# Patient Record
Sex: FEMALE | Race: OTHER | NOT HISPANIC OR LATINO
[De-identification: names, ages, dates, MRNs, and addresses within clinical notes are randomized per-mention and may not be internally consistent; named-entity substitution may affect disease eponyms.]

---

## 2017-12-05 ENCOUNTER — APPOINTMENT (OUTPATIENT)
Dept: ORTHOPEDIC SURGERY | Facility: CLINIC | Age: 57
End: 2017-12-05
Payer: COMMERCIAL

## 2017-12-05 PROCEDURE — 99213 OFFICE O/P EST LOW 20 MIN: CPT

## 2018-01-22 ENCOUNTER — APPOINTMENT (OUTPATIENT)
Dept: ORTHOPEDIC SURGERY | Facility: CLINIC | Age: 58
End: 2018-01-22
Payer: COMMERCIAL

## 2018-01-22 PROCEDURE — 73562 X-RAY EXAM OF KNEE 3: CPT | Mod: RT

## 2018-01-22 PROCEDURE — 99213 OFFICE O/P EST LOW 20 MIN: CPT | Mod: 25

## 2018-01-22 PROCEDURE — 73030 X-RAY EXAM OF SHOULDER: CPT | Mod: RT

## 2018-01-22 PROCEDURE — 20610 DRAIN/INJ JOINT/BURSA W/O US: CPT | Mod: RT

## 2018-02-12 ENCOUNTER — APPOINTMENT (OUTPATIENT)
Dept: ORTHOPEDIC SURGERY | Facility: CLINIC | Age: 58
End: 2018-02-12
Payer: COMMERCIAL

## 2018-02-12 VITALS — HEIGHT: 59 IN | WEIGHT: 135 LBS | BODY MASS INDEX: 27.21 KG/M2

## 2018-02-12 DIAGNOSIS — S46.811D STRAIN OF OTHER MUSCLES, FASCIA AND TENDONS AT SHOULDER AND UPPER ARM LEVEL, RIGHT ARM, SUBSEQUENT ENCOUNTER: ICD-10-CM

## 2018-02-12 PROCEDURE — 99213 OFFICE O/P EST LOW 20 MIN: CPT

## 2018-02-12 RX ORDER — MELOXICAM 7.5 MG/1
7.5 TABLET ORAL DAILY
Qty: 30 | Refills: 1 | Status: ACTIVE | COMMUNITY
Start: 2018-02-12 | End: 1900-01-01

## 2018-12-11 ENCOUNTER — APPOINTMENT (OUTPATIENT)
Dept: ORTHOPEDIC SURGERY | Facility: CLINIC | Age: 58
End: 2018-12-11
Payer: COMMERCIAL

## 2018-12-11 PROCEDURE — 99213 OFFICE O/P EST LOW 20 MIN: CPT

## 2018-12-11 PROCEDURE — 73590 X-RAY EXAM OF LOWER LEG: CPT | Mod: LT

## 2019-12-02 ENCOUNTER — APPOINTMENT (OUTPATIENT)
Dept: ORTHOPEDIC SURGERY | Facility: CLINIC | Age: 59
End: 2019-12-02
Payer: COMMERCIAL

## 2019-12-02 VITALS
SYSTOLIC BLOOD PRESSURE: 149 MMHG | WEIGHT: 135 LBS | DIASTOLIC BLOOD PRESSURE: 74 MMHG | HEIGHT: 59 IN | BODY MASS INDEX: 27.21 KG/M2 | HEART RATE: 76 BPM

## 2019-12-02 DIAGNOSIS — M75.101 UNSPECIFIED ROTATOR CUFF TEAR OR RUPTURE OF RIGHT SHOULDER, NOT SPECIFIED AS TRAUMATIC: ICD-10-CM

## 2019-12-02 DIAGNOSIS — M67.911 UNSPECIFIED DISORDER OF SYNOVIUM AND TENDON, RIGHT SHOULDER: ICD-10-CM

## 2019-12-02 PROCEDURE — 99214 OFFICE O/P EST MOD 30 MIN: CPT

## 2019-12-03 ENCOUNTER — OTHER (OUTPATIENT)
Age: 59
End: 2019-12-03

## 2020-01-14 ENCOUNTER — APPOINTMENT (OUTPATIENT)
Dept: ORTHOPEDIC SURGERY | Facility: CLINIC | Age: 60
End: 2020-01-14
Payer: COMMERCIAL

## 2020-01-14 PROCEDURE — 73590 X-RAY EXAM OF LOWER LEG: CPT | Mod: LT

## 2020-01-14 PROCEDURE — 99213 OFFICE O/P EST LOW 20 MIN: CPT

## 2020-01-14 NOTE — HISTORY OF PRESENT ILLNESS
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

## 2020-01-14 NOTE — PHYSICAL EXAM
[FreeTextEntry1] : Physical exam reveals a healthy-looking patient in no apparent distress patient appeared to be fully alert oriented having no significant complaints x-rays of the left ankle demonstrates good range of motion no swelling no palpable mass no redness no pain x-ray demonstrates complete resolution of the underlying process distal tibia and dysphagia patient will be followed and will be seen again in one year for followup

## 2021-01-26 ENCOUNTER — APPOINTMENT (OUTPATIENT)
Dept: ORTHOPEDIC SURGERY | Facility: CLINIC | Age: 61
End: 2021-01-26
Payer: COMMERCIAL

## 2021-01-26 PROCEDURE — 73600 X-RAY EXAM OF ANKLE: CPT | Mod: LT

## 2021-01-26 PROCEDURE — 99213 OFFICE O/P EST LOW 20 MIN: CPT

## 2021-01-26 PROCEDURE — 99072 ADDL SUPL MATRL&STAF TM PHE: CPT

## 2021-01-26 NOTE — HISTORY OF PRESENT ILLNESS
[FreeTextEntry1] : Patient was seen today in follow-up several years ago patient presented with a Kali abscess involving the left distal tibia at that time patient underwent intralesional curettage and IV antibiotic eventually patient had complete recovery and return to full level of activity without recurrence of infectious process at this stage patient having no significant complaints

## 2021-01-26 NOTE — PHYSICAL EXAM
[FreeTextEntry1] : Physical exam revealed a healthy looking patient in no apparent distress patient appears to be fully alert oriented basically having no significant complaints examination of the left leg demonstrate fully healed surgical scar no swelling no palpable mass new plain x-ray of the left ankle demonstrate complete bone remodeling and healing of the underlying process at this stage patient will be followed and will be seen again in 1 year for follow-up

## 2021-09-13 ENCOUNTER — EMERGENCY (EMERGENCY)
Facility: HOSPITAL | Age: 61
LOS: 1 days | Discharge: HOME | End: 2021-09-13
Attending: STUDENT IN AN ORGANIZED HEALTH CARE EDUCATION/TRAINING PROGRAM
Payer: COMMERCIAL

## 2021-09-13 VITALS
HEART RATE: 91 BPM | HEIGHT: 55 IN | WEIGHT: 134.92 LBS | OXYGEN SATURATION: 98 % | TEMPERATURE: 98 F | RESPIRATION RATE: 20 BRPM | SYSTOLIC BLOOD PRESSURE: 129 MMHG | DIASTOLIC BLOOD PRESSURE: 78 MMHG

## 2021-09-13 PROCEDURE — 72190 X-RAY EXAM OF PELVIS: CPT

## 2021-09-13 PROCEDURE — 73502 X-RAY EXAM HIP UNI 2-3 VIEWS: CPT | Mod: 26,RT

## 2021-09-13 PROCEDURE — 99284 EMERGENCY DEPT VISIT MOD MDM: CPT

## 2021-09-13 PROCEDURE — 99284 EMERGENCY DEPT VISIT MOD MDM: CPT | Mod: 25

## 2021-09-13 PROCEDURE — 73502 X-RAY EXAM HIP UNI 2-3 VIEWS: CPT

## 2021-09-13 RX ORDER — METHOCARBAMOL 500 MG/1
2 TABLET, FILM COATED ORAL
Qty: 28 | Refills: 0
Start: 2021-09-13 | End: 2021-09-19

## 2021-09-13 RX ORDER — METHOCARBAMOL 500 MG/1
750 TABLET, FILM COATED ORAL ONCE
Refills: 0 | Status: COMPLETED | OUTPATIENT
Start: 2021-09-13 | End: 2021-09-13

## 2021-09-13 RX ORDER — ACETAMINOPHEN 500 MG
650 TABLET ORAL ONCE
Refills: 0 | Status: COMPLETED | OUTPATIENT
Start: 2021-09-13 | End: 2021-09-13

## 2021-09-13 RX ADMIN — METHOCARBAMOL 750 MILLIGRAM(S): 500 TABLET, FILM COATED ORAL at 19:15

## 2021-09-13 RX ADMIN — Medication 650 MILLIGRAM(S): at 19:15

## 2021-09-13 NOTE — ED STATDOCS - PHYSICAL EXAMINATION
Vital Signs per nursing documentation  Gen: well appearing, no acute distress  HEENT: NCAT, MMM  Cardiac: regular rate rhythm, normal S1S2  Chest: clear to auscultation bilateral, no wheezes or crackles  Abdomen: soft, non tender non distended, no hernia  Extremity: no gross deformity, good perfusion  Skin: no rash  Neuro: nonfocal neuro exam, gait steady

## 2021-09-13 NOTE — ED STATDOCS - PROGRESS NOTE DETAILS
pain only when walking. denies pain at rest. discussed return precautions for worsening symptoms such as RLQ pain, fever, vomiting or any other concerning symptoms. patient and  comfortable with dc plan

## 2021-09-13 NOTE — ED STATDOCS - OBJECTIVE STATEMENT
61y/o F with PMHx of HTN, HLD presents to the ED c/o right groin pain which began 1 day ago. Pt states pain is worse when walking. Pt endorses recent injury where she did almost split yesterday, pulling groin. No trauma or treatment PTA, Denies abdominal pain, n/v. No past surgical history.

## 2021-09-13 NOTE — ED STATDOCS - NSFOLLOWUPINSTRUCTIONS_ED_ALL_ED_FT
Take medication as prescribed. Continue all home medications. Return for any right lower quadrant pain, fever, vomiting or any concerning or worsening symptoms

## 2021-09-13 NOTE — ED STATDOCS - PATIENT PORTAL LINK FT
You can access the FollowMyHealth Patient Portal offered by [facility] by registering at the following website: [email or web address]. By joining [facility]’s FollowMyHealth portal, you will also be able to view your health information using other applications (apps) compatible with our system.

## 2022-02-01 ENCOUNTER — APPOINTMENT (OUTPATIENT)
Dept: ORTHOPEDIC SURGERY | Facility: CLINIC | Age: 62
End: 2022-02-01
Payer: COMMERCIAL

## 2022-02-01 VITALS — BODY MASS INDEX: 27.21 KG/M2 | WEIGHT: 135 LBS | HEIGHT: 59 IN

## 2022-02-01 PROCEDURE — 99213 OFFICE O/P EST LOW 20 MIN: CPT

## 2022-02-01 PROCEDURE — 73590 X-RAY EXAM OF LOWER LEG: CPT | Mod: LT

## 2022-02-01 NOTE — HISTORY OF PRESENT ILLNESS
[FreeTextEntry1] : This is a 61 years old female that during early childhood and had a Kali abscess over the left distal tibia 50 years later which been about 7 years ago patient presented with a flareup of a Kali abscess underwent decompression irrigation antibiotic treatment and gradually the pain and swelling subsided and patient returned to full level of activity and no any recurrence since then.  At this point patient having no pain whatsoever return to full level of activity

## 2022-02-01 NOTE — PHYSICAL EXAM
[FreeTextEntry1] : Physical exam revealed a healthy looking patient in no apparent distress patient appears to be fully alert oriented having no complaints examination of the left the ankle demonstrate full range of motion there is a fully healed surgical scar no swelling no palpable mass no redness new plain x-ray of the left distal tibia ankle demonstrate complete resolution of the underlying condition at this stage patient will be followed and will be seen again in 1 year for follow-up

## 2022-02-09 ENCOUNTER — APPOINTMENT (OUTPATIENT)
Dept: ORTHOPEDIC SURGERY | Facility: CLINIC | Age: 62
End: 2022-02-09
Payer: COMMERCIAL

## 2022-02-09 DIAGNOSIS — M17.0 BILATERAL PRIMARY OSTEOARTHRITIS OF KNEE: ICD-10-CM

## 2022-02-09 PROCEDURE — 20610 DRAIN/INJ JOINT/BURSA W/O US: CPT | Mod: LT

## 2022-02-09 PROCEDURE — 73564 X-RAY EXAM KNEE 4 OR MORE: CPT | Mod: 50

## 2022-02-09 PROCEDURE — 99214 OFFICE O/P EST MOD 30 MIN: CPT | Mod: 25

## 2022-02-09 NOTE — DISCUSSION/SUMMARY
[de-identified] : I discussed the treatment of degenerative arthritis with the patient at length today. I described the spectrum of treatment from nonoperative modalities to total joint arthroplasty. Noninvasive and nonoperative treatment modalities include weight reduction, activity modification with low impact exercise, PRN use of acetaminophen or anti-inflammatory medication if tolerated, glucosamine/chondroitin supplements, and physical therapy. Further treatments can include corticosteroid injection and the use of hyaluronic acid injections. Definitive treatment can certainly include total joint arthroplasty also. The risks and benefits of each treatment options was discussed and all questions were answered.\par \par Injection: Right knee joint.\par Indication: Arthritis\par \par A discussion was had with the patient regarding this procedure and all questions were answered. All risks, benefits and alternatives were discussed. These included but were not limited to bleeding, infection, and allergic reaction. Alcohol was used to clean the skin, and betadine was used to sterilize and prep the area in the supero-lateral aspect of the right knee. Ethyl chloride spray was then used as a topical anesthetic. A 21-gauge needle was used to inject 4cc of 1% lidocaine and 1cc of 40mg/ml methylprednisolone into the knee. A sterile bandage was then applied. The patient tolerated the procedure well and there were no complications. \par \par Injection: Left knee joint.\par Indication: Arthritis\par A discussion was had with the patient regarding this procedure and all questions were answered. All risks, benefits and alternatives were discussed. These included but were not limited to bleeding, infection, and allergic reaction. Alcohol was used to clean the skin, and betadine was used to sterilize and prep the area in the supero-lateral aspect of the left knee. Ethyl chloride spray was then used as a topical anesthetic. A 21-gauge needle was used to inject 4cc of 1% lidocaine and 1cc of 40mg/ml methylprednisolone into the knee. A sterile bandage was then applied. The patient tolerated the procedure well and there were no complications.

## 2022-02-09 NOTE — HISTORY OF PRESENT ILLNESS
[de-identified] : 61-year-old Moroccan-speaking female with a chief complaint of bilateral knee pain right greater than left.  She reports pain for several years she reports having done medicine and therapy in the past.  Her pain is worse with walking.  She denies any numbness or tingling.\par \par The patient's past medical history, past surgical history, medications, allergies, and social history were reviewed by me today with the patient and documented accordingly. In addition, the patient's family history, which is noncontributory to this visit, was also reviewed.\par

## 2022-02-09 NOTE — PHYSICAL EXAM
[de-identified] : General Exam\par \par Well developed, well nourished\par No apparent distress\par Oriented to person, place, and time\par Mood: Normal\par Affect: Normal\par Balance and coordination: Normal\par Gait: Normal\par \par left knee exam\par \par Skin: Clean, dry, intact\par Inspection: No obvious malalignment, no masses, no swelling, no effusion.\par Tenderness: no MJLT. No LJLT. No tenderness over the medial and lateral patella facets. No ttp medial/lateral epicondyle, patella tendon, tibial tubercle, pes anserinus, or proximal fibula.\par ROM: 0 to 130° no pain with deep flexion in both knees\par Stability: Stable to varus, valgus, lachman testing. Negative anterior/posterior drawer.\par Additional tests: Negative McMurrays test, Negative patellar grind test. \par Strength: 5/5 Q/H/TA/GS/EHL, no atrophy\par Neuro: In tact to light touch throughout in dp/sp/tib/kevin/saph nerve districutions, DTR's normal\par Pulses: 2+ DP/PT pulses.\par  [de-identified] : \par The following radiographs were ordered and read by me during this patients visit. I reviewed each radiograph in detail with the patient and discussed the findings as highlighted below. \par \par 4 views of both knees were obtained today.  There is severe osteoarthritis in the right knee and moderate in the left knee with joint space narrowing osteophytes and sclerosis

## 2022-05-26 NOTE — ED ADULT TRIAGE NOTE - NSSEPSISSUSPECTED_ED_A_ED
You have been given emergency department evaluation.  This evaluation is intended to rule out life-threatening conditions.  Is not a complete evaluation.  You could require further testing as determined by your primary care physician or any referred specialist.  Please follow-up with all doctors that you are referred to.  Please be sure to take your prescribed medications and follow any specific instructions in the discharge instructions.  Please follow-up with your primary care physician within 48 hours.  Please have your primary care provider recheck your blood pressure.  Please return to the emergency department if you experience chest pain, shortness of breath, abdominal pain, fever greater than 102, intractable vomiting.  Please return to the emergency department if your symptoms continue or worsen, or if you begin to experience any other concerning symptom.    
No

## 2023-02-07 ENCOUNTER — APPOINTMENT (OUTPATIENT)
Dept: ORTHOPEDIC SURGERY | Facility: CLINIC | Age: 63
End: 2023-02-07
Payer: COMMERCIAL

## 2023-02-07 PROCEDURE — 99213 OFFICE O/P EST LOW 20 MIN: CPT

## 2023-02-07 PROCEDURE — 73600 X-RAY EXAM OF ANKLE: CPT | Mod: LT

## 2023-02-07 NOTE — HISTORY OF PRESENT ILLNESS
[FreeTextEntry1] : [place] is a follow-up visit for 62 years old female had a broadly with abscess involving the left distal tibia.  Several years ago patient underwent exploration decompression irrigation and since then remained stable without recurrence of the underlying infectious process.  Patient is stable and ambulating without any discomfort

## 2023-02-07 NOTE — PHYSICAL EXAM
[FreeTextEntry1] : Physical exam revealed a healthy looking patient in no apparent distress patient appears to be fully alert oriented having no complaint examination of the left ankle demonstrate full range of motion no swelling no palpable mass new x-ray demonstrated stable underlying process distal tibia at this stage patient will be followed and will be seen again in 1 year for follow-up

## 2023-03-28 ENCOUNTER — OFFICE (OUTPATIENT)
Dept: URBAN - METROPOLITAN AREA CLINIC 12 | Facility: CLINIC | Age: 63
Setting detail: OPHTHALMOLOGY
End: 2023-03-28
Payer: COMMERCIAL

## 2023-03-28 VITALS — HEIGHT: 55 IN

## 2023-03-28 DIAGNOSIS — D31.40: ICD-10-CM

## 2023-03-28 DIAGNOSIS — H25.13: ICD-10-CM

## 2023-03-28 DIAGNOSIS — H40.033: ICD-10-CM

## 2023-03-28 DIAGNOSIS — H35.033: ICD-10-CM

## 2023-03-28 PROCEDURE — 92014 COMPRE OPH EXAM EST PT 1/>: CPT | Performed by: OPTOMETRIST

## 2023-03-28 PROCEDURE — 92133 CPTRZD OPH DX IMG PST SGM ON: CPT | Performed by: OPTOMETRIST

## 2023-03-28 PROCEDURE — 92083 EXTENDED VISUAL FIELD XM: CPT | Performed by: OPTOMETRIST

## 2023-03-28 ASSESSMENT — REFRACTION_AUTOREFRACTION
OD_SPHERE: +2.75
OS_CYLINDER: -1.25
OS_SPHERE: +3.50
OS_AXIS: 091
OD_CYLINDER: -0.50
OD_AXIS: 106

## 2023-03-28 ASSESSMENT — AXIALLENGTH_DERIVED
OD_AL: 21.9191
OS_AL: 21.7937
OS_AL: 21.8354
OD_AL: 22.0035

## 2023-03-28 ASSESSMENT — REFRACTION_CURRENTRX
OS_AXIS: 82
OS_ADD: +2.25
OD_AXIS: 139
OD_OVR_VA: 20/
OD_VPRISM_DIRECTION: BF
OS_AXIS: 083
OS_CYLINDER: -0.50
OS_SPHERE: +3.25
OD_ADD: +2.50
OD_CYLINDER: SPH
OD_AXIS: 0
OD_SPHERE: +2.00
OS_VPRISM_DIRECTION: BF
OS_SPHERE: +3.00
OD_ADD: +2.25
OD_SPHERE: +2.25
OD_VPRISM_DIRECTION: SV
OS_CYLINDER: -0.75
OS_OVR_VA: 20/
OD_OVR_VA: 20/
OS_ADD: +2.50
OD_CYLINDER: -0.25
OS_VPRISM_DIRECTION: SV
OS_OVR_VA: 20/

## 2023-03-28 ASSESSMENT — KERATOMETRY
OS_K1POWER_DIOPTERS: 45.50
OD_K1POWER_DIOPTERS: 45.75
OS_K2POWER_DIOPTERS: 46.00
OD_K2POWER_DIOPTERS: 45.75
OD_AXISANGLE_DEGREES: 090
OS_AXISANGLE_DEGREES: 085

## 2023-03-28 ASSESSMENT — SPHEQUIV_DERIVED
OS_SPHEQUIV: 2.875
OS_SPHEQUIV: 2.75
OD_SPHEQUIV: 2.25
OD_SPHEQUIV: 2.5

## 2023-03-28 ASSESSMENT — CONFRONTATIONAL VISUAL FIELD TEST (CVF)
OD_FINDINGS: FULL
OS_FINDINGS: FULL

## 2023-03-28 ASSESSMENT — REFRACTION_MANIFEST
OS_SPHERE: +3.25
OS_CYLINDER: -1.00
OD_VA1: 20/15-2
OS_VA1: 20/20
OD_SPHERE: +2.50
OS_AXIS: 91
OD_AXIS: 108
OU_VA: 20/15-1
OD_CYLINDER: -0.50

## 2023-03-28 ASSESSMENT — TONOMETRY
OD_IOP_MMHG: 18
OS_IOP_MMHG: 18

## 2023-03-28 ASSESSMENT — VISUAL ACUITY
OD_BCVA: 20/25-2
OS_BCVA: 20/25-1

## 2023-04-17 ENCOUNTER — OFFICE (OUTPATIENT)
Dept: URBAN - METROPOLITAN AREA CLINIC 12 | Facility: CLINIC | Age: 63
Setting detail: OPHTHALMOLOGY
End: 2023-04-17
Payer: COMMERCIAL

## 2023-04-17 DIAGNOSIS — H40.033: ICD-10-CM

## 2023-04-17 PROCEDURE — 99213 OFFICE O/P EST LOW 20 MIN: CPT | Performed by: OPTOMETRIST

## 2023-04-17 ASSESSMENT — REFRACTION_MANIFEST
OD_VA1: 20/15-2
OS_VA1: 20/20
OU_VA: 20/15-1
OS_AXIS: 91
OS_SPHERE: +3.25
OD_AXIS: 108
OD_CYLINDER: -0.50
OS_CYLINDER: -1.00
OD_SPHERE: +2.50

## 2023-04-17 ASSESSMENT — VISUAL ACUITY
OS_BCVA: 20/25
OD_BCVA: 20/25

## 2023-04-17 ASSESSMENT — REFRACTION_CURRENTRX
OD_AXIS: 000
OS_CYLINDER: -0.75
OS_AXIS: 082
OD_SPHERE: +2.25
OS_OVR_VA: 20/
OD_OVR_VA: 20/
OS_ADD: +2.25
OS_VPRISM_DIRECTION: PROGS
OD_ADD: +2.25
OS_ADD: +2.50
OD_SPHERE: +2.25
OD_ADD: +2.50
OS_SPHERE: +3.25
OD_VPRISM_DIRECTION: BF
OD_OVR_VA: 20/
OD_AXIS: 0
OS_CYLINDER: -0.75
OD_VPRISM_DIRECTION: PROGS
OS_VPRISM_DIRECTION: BF
OD_CYLINDER: SPH
OD_CYLINDER: SPHERE
OS_AXIS: 82
OS_SPHERE: +3.25
OS_OVR_VA: 20/

## 2023-04-17 ASSESSMENT — REFRACTION_AUTOREFRACTION
OD_SPHERE: +3.00
OS_SPHERE: +3.50
OS_AXIS: 093
OD_AXIS: 106
OS_CYLINDER: -1.00
OD_CYLINDER: -0.75

## 2023-04-17 ASSESSMENT — KERATOMETRY
OD_K2POWER_DIOPTERS: 46.00
OS_K2POWER_DIOPTERS: 45.75
OD_AXISANGLE_DEGREES: 021
OD_K1POWER_DIOPTERS: 45.25
OS_K1POWER_DIOPTERS: 45.25
OS_AXISANGLE_DEGREES: 144

## 2023-04-17 ASSESSMENT — SPHEQUIV_DERIVED
OS_SPHEQUIV: 2.75
OD_SPHEQUIV: 2.25
OD_SPHEQUIV: 2.625
OS_SPHEQUIV: 3

## 2023-04-17 ASSESSMENT — CONFRONTATIONAL VISUAL FIELD TEST (CVF)
OD_FINDINGS: FULL
OS_FINDINGS: FULL

## 2023-04-17 ASSESSMENT — AXIALLENGTH_DERIVED
OS_AL: 21.9143
OD_AL: 22.0435
OS_AL: 21.8306
OD_AL: 21.9167

## 2023-04-17 ASSESSMENT — TONOMETRY
OS_IOP_MMHG: 17
OD_IOP_MMHG: 13

## 2023-12-27 ENCOUNTER — APPOINTMENT (OUTPATIENT)
Dept: ORTHOPEDIC SURGERY | Facility: CLINIC | Age: 63
End: 2023-12-27
Payer: COMMERCIAL

## 2023-12-27 VITALS — BODY MASS INDEX: 28.57 KG/M2 | WEIGHT: 127 LBS | HEIGHT: 56 IN

## 2023-12-27 DIAGNOSIS — M17.12 UNILATERAL PRIMARY OSTEOARTHRITIS, LEFT KNEE: ICD-10-CM

## 2023-12-27 DIAGNOSIS — M17.11 UNILATERAL PRIMARY OSTEOARTHRITIS, RIGHT KNEE: ICD-10-CM

## 2023-12-27 PROCEDURE — 99214 OFFICE O/P EST MOD 30 MIN: CPT | Mod: 25

## 2023-12-27 PROCEDURE — 20610 DRAIN/INJ JOINT/BURSA W/O US: CPT | Mod: RT

## 2023-12-27 NOTE — HISTORY OF PRESENT ILLNESS
[de-identified] : 63-year-old Ecuadorean-speaking female with a chief complaint of bilateral knee pain right greater than left. She reports pain for several years she reports having done medicine and therapy in the past. Her pain is worse with walking. She denies any numbness or tingling. She underwent cortisone injections earlier this year in February with relief pain has since returned she is requesting more today.

## 2023-12-27 NOTE — PHYSICAL EXAM
[de-identified] : left knee exam  Skin: Clean, dry, intact Inspection: No obvious malalignment, no masses, no swelling, no effusion. Tenderness: + MJLT. No LJLT. No tenderness over the medial and lateral patella facets. No ttp medial/lateral epicondyle, patella tendon, tibial tubercle, pes anserinus, or proximal fibula. ROM: 0 to 130 no pain with deep flexion in both knees Stability: Stable to varus, valgus, lachman testing. Negative anterior/posterior drawer. Additional tests: Negative McMurrays test, Negative patellar grind test.  Strength: 5/5 Q/H/TA/GS/EHL, no atrophy Neuro: In tact to light touch throughout in dp/sp/tib/kevin/saph nerve districutions, DTR's normal Pulses: 2+ DP/PT pulses.  right knee exam  Skin: Clean, dry, intact Inspection: No obvious malalignment, no masses, no swelling, no effusion. Tenderness: + MJLT. No LJLT. No tenderness over the medial and lateral patella facets. No ttp medial/lateral epicondyle, patella tendon, tibial tubercle, pes anserinus, or proximal fibula. ROM: 0 to 140 no pain with deep flexion  Stability: Stable to varus, valgus, lachman testing. Negative anterior/posterior drawer. Additional tests: Negative McMurrays test, Negative patellar grind test.  Strength: 5/5 Q/H/TA/GS/EHL, no atrophy Neuro: In tact to light touch throughout in dp/sp/tib/kevin/saph nerve districutions, DTR's normal Pulses: 2+ DP/PT pulses.

## 2023-12-27 NOTE — DISCUSSION/SUMMARY
[de-identified] : Bilateral knee osteoarthritis with acute exacerbation.  I discussed the treatment of degenerative arthritis with the patient at length today. I described the spectrum of treatment from nonoperative modalities to total joint arthroplasty. Noninvasive and nonoperative treatment modalities include weight reduction, activity modification with low impact exercise, PRN use of acetaminophen or anti-inflammatory medication if tolerated, glucosamine/chondroitin supplements, and physical therapy. Further treatments can include corticosteroid injection and the use of hyaluronic acid injections. Definitive treatment can certainly include total joint arthroplasty also. The risks and benefits of each treatment options was discussed and all questions were answered. Injection: Right knee joint.  Indication: Arthritis  A discussion was had with the patient regarding this procedure and all questions were answered. All risks, benefits and alternatives were discussed. These included but were not limited to bleeding, infection, and allergic reaction. Alcohol was used to clean the skin, and betadine was used to sterilize and prep the area in the supero-lateral aspect of the right knee. Ethyl chloride spray was then used as a topical anesthetic. A 21-gauge needle was used to inject 4cc of 1% lidocaine and 1cc of 40mg/ml methylprednisolone into the knee. A sterile bandage was then applied. The patient tolerated the procedure well and there were no complications.  Injection: Left knee joint.  Indication: Arthritis  A discussion was had with the patient regarding this procedure and all questions were answered. All risks, benefits and alternatives were discussed. These included but were not limited to bleeding, infection, and allergic reaction. Alcohol was used to clean the skin, and betadine was used to sterilize and prep the area in the supero-lateral aspect of the left knee. Ethyl chloride spray was then used as a topical anesthetic. A 21-gauge needle was used to inject 4cc of 1% lidocaine and 1cc of 40mg/ml methylprednisolone into the knee. A sterile bandage was then applied. The patient tolerated the procedure well and there were no complications.  ice, tylenol.  f/u prn

## 2024-02-15 ENCOUNTER — APPOINTMENT (OUTPATIENT)
Dept: ORTHOPEDIC SURGERY | Facility: CLINIC | Age: 64
End: 2024-02-15

## 2024-02-15 ENCOUNTER — APPOINTMENT (OUTPATIENT)
Dept: ORTHOPEDIC SURGERY | Facility: CLINIC | Age: 64
End: 2024-02-15
Payer: COMMERCIAL

## 2024-02-15 DIAGNOSIS — Z87.39 PERSONAL HISTORY OF OTHER DISEASES OF THE MUSCULOSKELETAL SYSTEM AND CONNECTIVE TISSUE: ICD-10-CM

## 2024-02-15 DIAGNOSIS — M89.9 DISORDER OF BONE, UNSPECIFIED: ICD-10-CM

## 2024-02-15 PROCEDURE — 99213 OFFICE O/P EST LOW 20 MIN: CPT

## 2024-02-15 PROCEDURE — 73610 X-RAY EXAM OF ANKLE: CPT | Mod: LT

## 2024-03-27 ENCOUNTER — RX RENEWAL (OUTPATIENT)
Age: 64
End: 2024-03-27

## 2024-05-28 ENCOUNTER — RX RENEWAL (OUTPATIENT)
Age: 64
End: 2024-05-28

## 2024-05-28 RX ORDER — MELOXICAM 15 MG/1
15 TABLET ORAL
Qty: 30 | Refills: 1 | Status: ACTIVE | COMMUNITY
Start: 2023-12-27 | End: 1900-01-01

## 2024-09-10 ENCOUNTER — RX RENEWAL (OUTPATIENT)
Age: 64
End: 2024-09-10

## 2024-11-09 ENCOUNTER — RX RENEWAL (OUTPATIENT)
Age: 64
End: 2024-11-09

## 2025-01-07 ENCOUNTER — RX RENEWAL (OUTPATIENT)
Age: 65
End: 2025-01-07

## 2025-04-22 ENCOUNTER — APPOINTMENT (OUTPATIENT)
Dept: ORTHOPEDIC SURGERY | Facility: CLINIC | Age: 65
End: 2025-04-22
Payer: MEDICARE

## 2025-04-22 ENCOUNTER — NON-APPOINTMENT (OUTPATIENT)
Age: 65
End: 2025-04-22

## 2025-04-22 VITALS — BODY MASS INDEX: 29.25 KG/M2 | HEIGHT: 56 IN | WEIGHT: 130 LBS

## 2025-04-22 DIAGNOSIS — M25.572 PAIN IN LEFT ANKLE AND JOINTS OF LEFT FOOT: ICD-10-CM

## 2025-04-22 PROCEDURE — 99203 OFFICE O/P NEW LOW 30 MIN: CPT

## 2025-04-22 PROCEDURE — 73610 X-RAY EXAM OF ANKLE: CPT | Mod: LT

## 2025-04-23 ENCOUNTER — OUTPATIENT (OUTPATIENT)
Dept: OUTPATIENT SERVICES | Facility: HOSPITAL | Age: 65
LOS: 1 days | End: 2025-04-23
Payer: MEDICARE

## 2025-04-23 ENCOUNTER — APPOINTMENT (OUTPATIENT)
Dept: MRI IMAGING | Facility: CLINIC | Age: 65
End: 2025-04-23
Payer: MEDICARE

## 2025-04-23 DIAGNOSIS — Z87.39 PERSONAL HISTORY OF OTHER DISEASES OF THE MUSCULOSKELETAL SYSTEM AND CONNECTIVE TISSUE: ICD-10-CM

## 2025-04-23 PROCEDURE — A9585: CPT

## 2025-04-23 PROCEDURE — 73723 MRI JOINT LWR EXTR W/O&W/DYE: CPT

## 2025-04-23 PROCEDURE — 73723 MRI JOINT LWR EXTR W/O&W/DYE: CPT | Mod: 26,LT

## 2025-04-30 ENCOUNTER — LABORATORY RESULT (OUTPATIENT)
Age: 65
End: 2025-04-30

## 2025-04-30 ENCOUNTER — APPOINTMENT (OUTPATIENT)
Dept: ORTHOPEDIC SURGERY | Facility: CLINIC | Age: 65
End: 2025-04-30
Payer: MEDICARE

## 2025-04-30 DIAGNOSIS — M86.662 OTHER CHRONIC OSTEOMYELITIS, LEFT TIBIA AND FIBULA: ICD-10-CM

## 2025-04-30 PROCEDURE — 99214 OFFICE O/P EST MOD 30 MIN: CPT

## 2025-05-01 LAB
ALBUMIN SERPL ELPH-MCNC: 4.1 G/DL
ALP BLD-CCNC: 90 U/L
ALT SERPL-CCNC: 12 U/L
ANION GAP SERPL CALC-SCNC: 13 MMOL/L
APPEARANCE: CLEAR
AST SERPL-CCNC: 19 U/L
BILIRUB SERPL-MCNC: 0.2 MG/DL
BILIRUBIN URINE: NEGATIVE
BLOOD URINE: NEGATIVE
BUN SERPL-MCNC: 16 MG/DL
CALCIUM SERPL-MCNC: 9.6 MG/DL
CHLORIDE SERPL-SCNC: 102 MMOL/L
CO2 SERPL-SCNC: 25 MMOL/L
COLOR: YELLOW
CREAT SERPL-MCNC: 0.77 MG/DL
CRP SERPL-MCNC: 11 MG/L
EGFRCR SERPLBLD CKD-EPI 2021: 85 ML/MIN/1.73M2
ERYTHROCYTE [SEDIMENTATION RATE] IN BLOOD BY WESTERGREN METHOD: 44 MM/HR
GLUCOSE QUALITATIVE U: NEGATIVE MG/DL
GLUCOSE SERPL-MCNC: 93 MG/DL
HCT VFR BLD CALC: 38.2 %
HGB BLD-MCNC: 12.3 G/DL
INR PPP: 0.9 RATIO
KETONES URINE: NEGATIVE MG/DL
LEUKOCYTE ESTERASE URINE: ABNORMAL
MCHC RBC-ENTMCNC: 28.9 PG
MCHC RBC-ENTMCNC: 32.2 G/DL
MCV RBC AUTO: 89.9 FL
NITRITE URINE: NEGATIVE
PH URINE: 6
PLATELET # BLD AUTO: 414 K/UL
POTASSIUM SERPL-SCNC: 4.3 MMOL/L
PROT SERPL-MCNC: 6.8 G/DL
PROTEIN URINE: NEGATIVE MG/DL
PT BLD: 10.7 SEC
RBC # BLD: 4.25 M/UL
RBC # FLD: 13 %
SODIUM SERPL-SCNC: 140 MMOL/L
SPECIFIC GRAVITY URINE: 1.01
UROBILINOGEN URINE: 0.2 MG/DL
WBC # FLD AUTO: 7.14 K/UL

## 2025-05-02 ENCOUNTER — OUTPATIENT (OUTPATIENT)
Dept: OUTPATIENT SERVICES | Facility: HOSPITAL | Age: 65
LOS: 1 days | End: 2025-05-02

## 2025-05-02 VITALS
HEART RATE: 90 BPM | OXYGEN SATURATION: 99 % | SYSTOLIC BLOOD PRESSURE: 110 MMHG | WEIGHT: 130.07 LBS | HEIGHT: 56 IN | RESPIRATION RATE: 15 BRPM | TEMPERATURE: 98 F | DIASTOLIC BLOOD PRESSURE: 64 MMHG

## 2025-05-02 DIAGNOSIS — M86.662 OTHER CHRONIC OSTEOMYELITIS, LEFT TIBIA AND FIBULA: ICD-10-CM

## 2025-05-02 DIAGNOSIS — Z98.890 OTHER SPECIFIED POSTPROCEDURAL STATES: Chronic | ICD-10-CM

## 2025-05-02 DIAGNOSIS — I10 ESSENTIAL (PRIMARY) HYPERTENSION: ICD-10-CM

## 2025-05-02 RX ORDER — LORATADINE 5 MG/5ML
1 SOLUTION ORAL
Refills: 0 | DISCHARGE

## 2025-05-02 RX ORDER — SODIUM CHLORIDE 9 G/1000ML
1000 INJECTION, SOLUTION INTRAVENOUS
Refills: 0 | Status: DISCONTINUED | OUTPATIENT
Start: 2025-05-09 | End: 2025-05-10

## 2025-05-02 NOTE — H&P PST ADULT - ENDOCRINE
Pt had a few days of left sided chest pain radiating to left arm and neck. She felt like she had swallowed a bubble of air and it was trapped in her chest. Was evaluated in [facility] and found to have an NSTEMI, had 3 vessel disease on heart cath. Was given options of stenting, cabg or medical management and she opted for medical management. Records requested.   Cannot tolerate carvedilol or metoprolol due to hallucinating seeing dead people when on beta blockers. Is on asa and plavix. Prior severe cramps with statins but tolerating crestor 5 mg and will try inc to 10 mg and gradually increase further as tolerated.   She does not smoke or drink alcohol.   She has close follow up with cardiology at [facility] this month and a repeat echo in March.   She notes bp at home is 120s/70s. She is on losartan 50 mg, jardiance 25 mg  On metformin 500mg a day, A1c 6.0    Today in clinic and since her hospital discharge she reports no chest pain or pressure or shortness of breath or headaches. She feels well.   Follow up with cardiology as scheduled. Discuss with them that unable to take the beta blockers due to adverse side effects, continue on asa, plavix, statin, arb, sglt2  ER precautions discussed.     
details…

## 2025-05-02 NOTE — H&P PST ADULT - MUSCULOSKELETAL COMMENTS
pr eop dx- recurrent L distal tibia Kali's abscess, refer HPI- c/o of left lower leg  chronic swelling, with progressive pain- plan for tibial surgery

## 2025-05-02 NOTE — H&P PST ADULT - NSICDXPASTMEDICALHX_GEN_ALL_CORE_FT
PAST MEDICAL HISTORY:  Chronic osteomyelitis     Hyperlipidemia     Hypertension     Left ankle pain

## 2025-05-02 NOTE — H&P PST ADULT - PROBLEM SELECTOR PLAN 2
November 29, 2022     [facility]  [place] [address]    Patient: Eloy Lau   YOB: 1959   Date of Visit: 11/29/2022       Dear Dr Radha Mancia: Thank you for referring Eloy Lau to me for evaluation  Below are my notes for this consultation  If you have questions, please do not hesitate to call me  I look forward to following your patient along with you  Sincerely,        Skylar Bear MD        CC: BRITANY Bishop MD Jerlyn Setter, DO Skylar Bear MD  11/29/2022  9:03 AM  Sign when Signing Visit  11/29/2022    Ragini Al  1959  [id number]        Assessment  History of right hydronephrosis secondary to endometrial carcinoma, status post right nephrostomy tube insertion      Discussion  Fortunately she is doing very well with her right nephroureteral stent capped  In addition her disease appears to be stable on immunotherapy  She will continue to follow with gynecological oncology  She is scheduled to return to Interventional Radiology on December 20, 2022 for routine tube exchange  At that time I would recommend internalization of her right nephroureteral stent to a double-J right ureteral stent  As the tube has been capped for 2 months without issue she likely does not even require a safety nephrostomy tube  One she has an internalized stent she will then return to the operating room with me approximately 3 months later for cystoscopy with a retrograde pyelogram and routine stent exchange versus stent removal if the retrograde pyelogram appears normal   The patient is amenable with this plan  History of Present Illness  70 y o  female with a history of recurrent stage I B endometrial carcinoma  She follows with gynecological oncology  She is receiving immunotherapy    She recently returned to Interventional Radiology on September 19, 2022 and underwent exchange of her nephroureteral stent which was then capped  She has not had an issue since that time  She is very pleased that she does not have a bag external to drain urine  She returns with her friend for routine follow-up and discussion today  In addition her most recent follow-up CT scan from October shows stability of her disease  AUA Symptom Score      Review of Systems  Review of Systems   Constitutional: Negative  HENT: Negative  Eyes: Negative  Respiratory: Negative  Cardiovascular: Negative  Gastrointestinal: Negative  Endocrine: Negative  Genitourinary:        Per HPI   Musculoskeletal: Negative  Skin: Negative  Allergic/Immunologic: Negative  Neurological: Negative  Hematological: Negative  Psychiatric/Behavioral: Negative            Past Medical History  Past Medical History:   Diagnosis Date   • Anemia    • Chemotherapy follow-up examination    • Depression    • Endometrial cancer ([facility] [address] ) 12/2017   • History of chemotherapy     started 12/2021endometrial cancer   • Hyperglycemia     vx type 2 dm -- last assessed 4/1/14; resolved 11/7/17   • Hyperlipidemia    • Hypertension    • Obesity     last assessed 10/14/17; resolved 11/7/17       Past Social History  Past Surgical History:   Procedure Laterality Date   • ABDOMINAL SURGERY      GASTRIC BYPASS   • BREAST BIOPSY Right 06/28/2019    core biopsy; benign   • CHOLECYSTECTOMY      at the time of gastric bypass   • COLONOSCOPY     • CT NEEDLE BIOPSY LYMPH NODE  07/08/2019   • FL GUIDED CENTRAL VENOUS ACCESS DEVICE INSERTION  11/12/2019   • GASTRIC BYPASS     • HYSTERECTOMY Bilateral 2017    total abdominal with salpingo-oophorectomy   • IR NEPHROSTOMY TO NEPHROURETERAL STENT  06/09/2022   • IR NEPHROSTOMY TUBE CHECK/CHANGE/REPOSITION/REINSERTION/UPSIZE  05/27/2022   • IR NEPHROSTOMY TUBE PLACEMENT  07/26/2019   • IR NEPHROURETERAL STENT CHECK/CHANGE/REPOSITION  04/06/2021   • IR NEPHROURETERAL STENT CHECK/CHANGE/REPOSITION 06/04/2021   • IR NEPHROURETERAL STENT CHECK/CHANGE/REPOSITION  09/03/2021   • IR NEPHROURETERAL STENT CHECK/CHANGE/REPOSITION  12/07/2021   • IR NEPHROURETERAL STENT CHECK/CHANGE/REPOSITION  03/08/2022   • IR NEPHROURETERAL STENT CHECK/CHANGE/REPOSITION  05/10/2022   • IR NEPHROURETERAL STENT CHECK/CHANGE/REPOSITION  9/19/2022   • IR PICC LINE  09/27/2019   • IR PORT PLACEMENT  07/26/2019   • IR PORT REMOVAL  09/20/2019   • OOPHORECTOMY Bilateral 2017   • NM INSJ TUNNELED CTR VAD W/SUBQ PORT AGE 3 YR/> Left 11/12/2019    Procedure: INSERTION VENOUS PORT ( PORT-A-CATH) IR;  Surgeon: Yohannes Ring DO;  Location[facility];  Service: Interventional Radiology   • NM LAP, RADICAL HYST W/ TUBE&OV, NODE BX N/A 12/19/2017    Procedure: HYSTERECTOMY LAPAROSCOPIC TOTAL ([address]) W/ ROBOTICS; BILATERAL SALPINGOOPHERECTOMY; LYMPH NODE DISSECTION; lysis of adhesions;  Surgeon: Andre Guido MD;  Location: [facility];  Service: Gynecology Oncology   • NM LAP,DIAGNOSTIC ABDOMEN N/A 12/19/2017    Procedure: LAPAROSCOPY DIAGNOSTIC;  Surgeon: Andre Guido MD;  Location: [facility];  Service: Gynecology Oncology   • TONSILLECTOMY     • US GUIDED BREAST BIOPSY RIGHT COMPLETE Right 06/28/2019       Past Family History  Family History   Problem Relation Age of Onset   • Other Mother         dyslipidemia   • Ovarian cancer Mother 48   • Lymphoma Father    • Breast cancer Sister 61   • No Known Problems Maternal Grandmother    • Bone cancer Maternal Grandfather    • Uterine cancer Paternal Grandmother    • No Known Problems Paternal Grandfather    • No Known Problems Brother    • No Known Problems Brother    • No Known Problems Maternal Aunt    • No Known Problems Maternal Aunt    • No Known Problems Maternal Aunt    • No Known Problems Maternal Aunt    • No Known Problems Paternal Aunt    • No Known Problems Paternal Aunt    • No Known Problems Paternal Aunt    • No Known Problems Paternal Aunt        Past Social history  Social History     Socioeconomic History   • Marital status: Single     Spouse name: Not on file   • Number of children: Not on file   • Years of education: Not on file   • Highest education level: Not on file   Occupational History   • Not on file   Tobacco Use   • Smoking status: Never   • Smokeless tobacco: Never   Vaping Use   • Vaping Use: Never used   Substance and Sexual Activity   • Alcohol use: Not Currently   • Drug use: No   • Sexual activity: Not Currently   Other Topics Concern   • Not on file   Social History Narrative   • Not on file     Social Determinants of Health     Financial Resource Strain: Not on file   Food Insecurity: Not on file   Transportation Needs: Not on file   Physical Activity: Not on file   Stress: Not on file   Social Connections: Not on file   Intimate Partner Violence: Not on file   Housing Stability: Not on file       Current Medications  Current Outpatient Medications   Medication Sig Dispense Refill   • ARIPiprazole (ABILIFY) 20 MG tablet Take 20 mg by mouth in the morning       • aspirin (ECOTRIN LOW STRENGTH) 81 mg EC tablet Take 81 mg by mouth daily     • BD PosiFlush 0 9 % SOLN      • Calcium-Magnesium-Vitamin D (CALCIUM 500 PO) Take by mouth daily      • cholecalciferol (VITAMIN D3) 1,000 units tablet Take 1,000 Units by mouth daily     • Cranberry-Vitamin C 250-60 MG CAPS Take 1 tablet by mouth in the morning 90 capsule 3   • Cyanocobalamin (VITAMIN B12 PO) Take by mouth daily      • dronabinol (MARINOL) 2 5 mg capsule Take 1 capsule (2 5 mg total) by mouth 2 (two) times a day before meals 30 capsule 0   • enoxaparin (LOVENOX) 80 mg/0 8 mL Inject 0 8 mL (80 mg total) under the skin every 24 hours 72 mL 1   • folic acid (FOLVITE) 1 mg tablet Take 1 tablet (1 mg total) by mouth daily  0   • gemfibrozil (LOPID) 600 mg tablet TAKE 1 TABLET BY MOUTH EVERY DAY 90 tablet 0   • lisinopril (ZESTRIL) 5 mg tablet Take 1 tablet (5 mg total) by mouth daily 90 tablet 3   • LORazepam (ATIVAN) 0 5 mg tablet Take 1 tablet (0 5 mg total) by mouth every 6 (six) hours as needed for anxiety (or nausea) 36 tablet 1   • Multiple Vitamin (MULTIVITAMIN) tablet Take 1 tablet by mouth daily     • omeprazole (PriLOSEC) 20 mg delayed release capsule Take 20 mg by mouth daily     • ondansetron (ZOFRAN) 8 mg tablet Take 1 tablet (8 mg total) by mouth every 8 (eight) hours as needed for nausea or vomiting 30 tablet 1   • oxybutynin (DITROPAN XL) 15 MG 24 hr tablet Take 1 tablet (15 mg total) by mouth daily at bedtime 90 tablet 3   • rucaparib (RUBRACA) 300 mg tablet Take 600 mg by mouth every 12 (twelve) hours Take with or without food  Do not repeat a vomited dose  • saccharomyces boulardii (FLORASTOR) 250 mg capsule Take 1 capsule (250 mg total) by mouth 2 (two) times a day  0   • senna (SENOKOT) 8 6 MG tablet Take 1 tablet (8 6 mg total) by mouth 2 (two) times a day as needed for constipation 60 tablet 0   • sodium chloride, PF, 0 9 % 10 mL by Intracatheter route daily 300 mL 3   • venlafaxine (EFFEXOR) 75 mg tablet Take 75 mg by mouth 3 (three) times a day       • Vibegron 75 MG TABS Take 75 mg by mouth in the morning 30 tablet 3   • amoxicillin (AMOXIL) 500 mg capsule  (Patient not taking: Reported on 11/29/2022)     • clotrimazole-betamethasone (LOTRISONE) 1-0 05 % cream Apply topically 2 (two) times a day (Patient not taking: Reported on 8/23/2022) 30 g 0   • ergocalciferol (VITAMIN D2) 50,000 units Take 1 capsule (50,000 Units total) by mouth once a week for 6 doses (Patient not taking: Reported on 11/29/2022) 6 capsule 0   • estradiol (ESTRACE VAGINAL) 0 1 mg/g vaginal cream Insert 1g nightly for 6 weeks, then 1-2x weekly  (Patient not taking: Reported on 11/11/2022) 42 5 g 1   • methylprednisolone (MEDROL) 4 mg tablet Take 1 tablet (4 mg total) by mouth in the morning Take 2 tables once a day for 5 days, take 1 tablet once a day for 5 days, take 1 tablet every other day for 14 days   (Patient not taking: Reported on 11/11/2022) 21 tablet 0   • naloxone (NARCAN) 4 mg/0 1 mL nasal spray Administer 1 spray into a nostril  If no response after 2-3 minutes, give another dose in the other nostril using a new spray  (Patient not taking: Reported on 11/11/2022) 1 each 0     No current facility-administered medications for this visit  Allergies  Allergies   Allergen Reactions   • Cephalosporins Rash     Which Cephalosporin reaction was to not specified; however, has tolerated Amoxicillin, Cefazolin, and Cefepime       Past Medical History, Social History, Family History, medications and allergies were reviewed  Vitals  Vitals:    11/29/22 0817   BP: 118/78   BP Location: Left arm   Patient Position: Sitting   Cuff Size: Adult   Temp: (!) 78 °F (25 6 °C)   SpO2: 91%   Weight: 54 1 kg (119 lb 3 2 oz)   Height: 4' 11" (1 499 m)       Physical Exam  Physical Exam  On examination she is in no acute distress  A right nephroureteral stent is visualized exiting from her right flank  The insertion site is clean dry and intact  The stent is connected to extension tubing and capped      Results  No results found for: PSA  Lab Results   Component Value Date    GLUCOSE 219 (H) 12/19/2017    CALCIUM 9 7 11/11/2022     10/27/2017    K 4 4 11/11/2022    CO2 22 11/11/2022     11/11/2022    BUN 26 (H) 11/11/2022    CREATININE 1 43 (H) 11/11/2022     Lab Results   Component Value Date    WBC 7 96 11/11/2022    HGB 10 5 (L) 11/11/2022    HCT 33 3 (L) 11/11/2022    MCV 96 11/11/2022     11/11/2022         Office Urine Dip  No results found for this or any previous visit (from the past 1 hour(s)) ] Patient instructed to take losartan with a sip of water on the morning of procedure.

## 2025-05-02 NOTE — H&P PST ADULT - ATTENDING COMMENTS
I have consented the patient for left tibia open biopsy, sequestrectomy, irrigation and debridement, possible insertion of antibiotic beads. We discussed risks, benefits and alternatives. Rationale of care was reviewed and all questions were answered. Surgical risks include but are not limited to infection, bleeding, nerve damage, chronic pain, limited ROM, weakness, fracture, VTE, loss of life/limb, and need for further surgical procedures.  The patient understood all of this and would like to proceed. Specifically, she understands infections may be unpredictable and may require multiple I&D's to achieve local infection control and may recur in the future.

## 2025-05-02 NOTE — H&P PST ADULT - PROBLEM SELECTOR PLAN 1
Patient is tentatively scheduled  left distal tibia open biopsy, curettage, sequestrectomy and debridement insertion of antibiotic leads with Dr Jett.- 05/7/25.    Pre-op instructions provided. Pt given verbal and written instructions with teach back on chlorhexidine wash and pepcid. Pt verbalized understanding with return demonstration.    Recent CBC, BMP - WNL  in [facility]- 04/30/25. Patient is tentatively scheduled  left distal tibia open biopsy, curettage, sequestrectomy and debridement insertion of antibiotic leads with Dr Jett.- 05/7/25.    Pre-op instructions provided. Pt given verbal and written instructions with teach back on chlorhexidine wash and pepcid. Pt verbalized understanding with return demonstration.    Recent CBC, BMP - WNL  in [facility]- 04/30/25.  LD of meloxicam 05/1/25

## 2025-05-02 NOTE — H&P PST ADULT - HISTORY OF PRESENT ILLNESS
54 year [facility] with pr op diagnosis of recurrent L distal tibia Kali's abscess, for pre op evaluation prior to scheduled  left distal tibia open biopsy, curettage, sequestrectomy and debridement insertion of antibiotic leads with Dr Jett. Patient with history of  L distal tibia Kali's abscess s/p I&D (2015, Johnie Ryan), return for FU due to recent progressive L ankle pain at the prior surgical site. Feeling slightly better now with Tylenol but continues to have severe left lower leg pain. Denies any fevers. 64 year old female presents to [facility], with pr op diagnosis of recurrent L distal tibia Kali's abscess, for pre op evaluation prior to scheduled  left distal tibia open biopsy, curettage, sequestrectomy and debridement insertion of antibiotic leads with Dr Jett. Patient with history of  L distal tibia Kali's abscess s/p I&D (2015, Johnie Ryan),followed up with ortho  due to recent progressive L ankle pain at the prior surgical site. Feeling slightly better now with Tylenol but continues to have severe left lower leg pain. Denies any fevers.

## 2025-05-08 NOTE — ASU PATIENT PROFILE, ADULT - NSICDXPASTMEDICALHX_GEN_ALL_CORE_FT
I called patient, cough and cold x 3 days. No fever, non-productive cough, she has tried otc products , not feeling much better. Body aches, I encouraged rest, tylenol prn, and continue with otc. She wants to speak with you .   PAST MEDICAL HISTORY:  Chronic osteomyelitis     Hyperlipidemia     Hypertension     Left ankle pain

## 2025-05-08 NOTE — ASU PATIENT PROFILE, ADULT - PATIENT'S HEIGHT AND WEIGHT RECORDED IN THE VITAL SIGNS FLOWSHEET
Progress Note    Patient ID: Yoko is a 62 year old female  Chief Complaint  Chief Complaint   Patient presents with   • Abdominal Pain   • Other     red mucus stool     History of Present Illness  Problem List Items Addressed This Visit      Digestive   Acute diarrhea - Primary      6 days ago had epigastric pain and  then lower down pelvic wave of sharp pain and then no diarrhea but had pain for 3 days in waves--had blood and mucous the first day of this 6 days ago --the ext day , may have had some mucous also --had eaten some steak, rice and salad and everyone else was ok --no fever or chills -she took one does of pepto-bismol  No recent antibiotics   currently she feels fine --she used to have hemorhoids  Last colonoscopy was 4/2019 with dr callejas and is to have again in 5 years   Her stools are now formed again        Relevant Orders   CBC WITH DIFFERENTIAL   COMPREHENSIVE METABOLIC PANEL   CT ABDOMEN PELVIS W WO CONTRAST     Other   Blood in the stool      May have been just from the diarrhea --if recurs, need to get stool samples and see Gastroenterology       Relevant Orders   CBC WITH DIFFERENTIAL   COMPREHENSIVE METABOLIC PANEL   CT ABDOMEN PELVIS W WO CONTRAST          History  Current Outpatient Medications   Medication Sig Dispense Refill   • Omega-3 Fatty Acids (FISH OIL PO) Take by mouth daily.     • CALCIUM CARBONATE-VITAMIN D PO Take by mouth daily.      • Coenzyme Q10 (CO Q 10) 10 MG Cap Take by mouth daily.      • fexofenadine (ALLEGRA) 180 MG tablet Take 180 mg by mouth daily.     • magnesium chloride 64 MG Tablet Enteric Coated deleayed release tablet Take 1 tablet by mouth daily.     • aspirin-acetaminophen-caffeine (EXCEDRIN MIGRAINE) 250-250-65 MG per tablet Take 1 tablet by mouth as needed.      • ALPRAZolam (XANAX) 0.25 MG tablet Take 1 tablet by mouth daily as needed for Sleep or Anxiety. TAKE 1 TABLET DAILY AS NEEDED. 90 tablet 1   • sertraline (ZOLOFT) 50 MG tablet Take 1.5 tablets by  mouth daily. 135 tablet 3     No current facility-administered medications for this visit.       Allergies  ALLERGIES:  No Known Allergies    Immunization History   Administered Date(s) Administered   • COVID-19 [employer] 03/10/2021   • Influenza, injectable, quadrivalent 2019   • Influenza, injectable, quadrivalent, preservative-free 10/09/2015, 10/10/2016, 10/13/2017, 2019, 2021   • Influenza, seasonal, injectable, preservative free 10/01/2012, 10/01/2013, 10/09/2014   • Influenza, seasonal, injectable, trivalent 10/01/2012, 10/01/2013, 10/09/2014       Screening schedule/checklist for next 5-10 years:  Health Maintenance Due   Topic Date Due   • DTaP/Tdap/Td Vaccine (1 - Tdap) Never done   • Shingles Vaccine (1 of 2) Never done   • Hepatitis C Screening  Never done        Family History  Family History   Problem Relation Age of Onset   • Cancer, Colon Mother         came from [facility] after the hurricane    • HIV Mother         for many years   • Motor Vehicle Accident Father    • Osteoarthritis Sister    • Osteoarthritis Brother    • Osteoarthritis Brother    • Osteoarthritis Brother    • Osteoarthritis Brother    • Patient is unaware of any medical problems Daughter    • Patient is unaware of any medical problems Son      Social History  Social History     Socioeconomic History   • Marital status: /Civil Union     Spouse name: edgar    • Number of children: 2   • Years of education: Not on file   • Highest education level: Not on file   Occupational History   • Occupation: went back to work at [employer]     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    Tobacco Use   • Smoking status: Former Smoker     Packs/day: 0.25     Years: 2.00     Pack years: 0.50     Types: Cigarettes     Quit date: 1976     Years since quittin.5   • Smokeless tobacco: Never Used   • Tobacco comment: smoked as a teen   Substance and Sexual Activity   • Alcohol use: Yes      Comment: social   • Drug use: Never   • Sexual activity: Not Currently   Other Topics Concern   • Not on file   Social History Narrative    Grandchildren --2     --ok except back issues and parkinsons possibly --getting worked up in 2021    Her son's ex-wife and their child live with them along with her son and her sister-in-law lives there also      Social Determinants of Health     Financial Resource Strain:    • Social Determinants: Financial Resource Strain:    Food Insecurity:    • Social Determinants: Food Insecurity:    Transportation Needs:    • Lack of Transportation (Medical):    • Lack of Transportation (Non-Medical):    Physical Activity: Inactive   • Days of Exercise per Week: 0 days   • Minutes of Exercise per Session: 0 min   Stress:    • Social Determinants: Stress:    Social Connections:    • Social Determinants: Social Connections:    Intimate Partner Violence:    • Social Determinants: Intimate Partner Violence Past Fear:    • Social Determinants: Intimate Partner Violence Current Fear:      Past Medical History   Past Medical History:   Diagnosis Date   • ADHD    • Anxiety    • Heartburn 5/6/2019   • History of anxiety    • History of chest pain    • Osteopenia    • Palpitations    • Symptomatic PVCs 4/12/2019     Past Surgical History  Past Surgical History:   Procedure Laterality Date   • Colposcopy     • Tonsillectomy and adenoidectomy     • Tubal ligation       Review of Systems  Review of Systems   All other systems reviewed and are negative.    Visit Vitals  /70 (BP Location: LUE - Left upper extremity, Patient Position: Sitting)   Pulse 72   Temp 97.9 °F (36.6 °C) (Temporal)   Ht 5' 2\" (1.575 m)   Wt 63.5 kg (140 lb)   SpO2 96%   BMI 25.61 kg/m²     Physical Exam  Physical Exam   Constitutional: She appears well-developed and well-nourished. No distress.   HENT:   Head: Normocephalic and atraumatic.   Cardiovascular: Normal rate, regular rhythm and normal heart sounds.   No  murmur heard.  Pulmonary/Chest: Effort normal and breath sounds normal. No respiratory distress. She has no wheezes. She has no rales.   Abdominal: Soft. Bowel sounds are normal. She exhibits no distension and no mass. There is no abdominal tenderness. There is no rebound and no guarding.   Genitourinary: Rectum:      Guaiac result negative.      Genitourinary Comments: No rectal mass or hemorrhoid felt --stool brown and not pos for blood     Skin: Skin is warm and dry. She is not diaphoretic.   Psychiatric: She has a normal mood and affect.     Assessment and Plan    Problem List Items Addressed This Visit        Digestive    Acute diarrhea - Primary     6 days ago had epigastric pain and  then lower down pelvic wave of sharp pain and then no diarrhea but had pain for 3 days in waves--had blood and mucous the first day of this 6 days ago --the ext day , may have had some mucous also --had eaten some steak, rice and salad and everyone else was ok --no fever or chills -she took one does of pepto-bismol  No recent antibiotics   currently she feels fine --she used to have hemorhoids  Last colonoscopy was 4/2019 with dr callejas and is to have again in 5 years   Her stools are now formed again          Relevant Orders    CBC WITH DIFFERENTIAL    COMPREHENSIVE METABOLIC PANEL    CT ABDOMEN PELVIS W WO CONTRAST       Other    Blood in the stool     May have been just from the diarrhea --if recurs, need to get stool samples and see Gastroenterology         Relevant Orders    CBC WITH DIFFERENTIAL    COMPREHENSIVE METABOLIC PANEL    CT ABDOMEN PELVIS W WO CONTRAST         yes

## 2025-05-09 ENCOUNTER — INPATIENT (INPATIENT)
Facility: HOSPITAL | Age: 65
LOS: 5 days | Discharge: HOME | End: 2025-05-15
Attending: ORTHOPAEDIC SURGERY | Admitting: ORTHOPAEDIC SURGERY
Payer: MEDICARE

## 2025-05-09 ENCOUNTER — APPOINTMENT (OUTPATIENT)
Dept: ORTHOPEDIC SURGERY | Facility: HOSPITAL | Age: 65
End: 2025-05-09

## 2025-05-09 ENCOUNTER — TRANSCRIPTION ENCOUNTER (OUTPATIENT)
Age: 65
End: 2025-05-09

## 2025-05-09 VITALS
SYSTOLIC BLOOD PRESSURE: 135 MMHG | DIASTOLIC BLOOD PRESSURE: 74 MMHG | HEIGHT: 56 IN | TEMPERATURE: 98 F | HEART RATE: 95 BPM | OXYGEN SATURATION: 98 % | WEIGHT: 130.07 LBS | RESPIRATION RATE: 16 BRPM

## 2025-05-09 DIAGNOSIS — Z98.890 OTHER SPECIFIED POSTPROCEDURAL STATES: Chronic | ICD-10-CM

## 2025-05-09 DIAGNOSIS — M86.662 OTHER CHRONIC OSTEOMYELITIS, LEFT TIBIA AND FIBULA: ICD-10-CM

## 2025-05-09 DIAGNOSIS — M86.8X6 OTHER OSTEOMYELITIS, LOWER LEG: ICD-10-CM

## 2025-05-09 LAB
BACTERIA BLD CULT: NORMAL
GRAM STN FLD: ABNORMAL
NIGHT BLUE STAIN TISS: SIGNIFICANT CHANGE UP
SPECIMEN SOURCE: SIGNIFICANT CHANGE UP

## 2025-05-09 PROCEDURE — 88342 IMHCHEM/IMCYTCHM 1ST ANTB: CPT | Mod: 26

## 2025-05-09 PROCEDURE — 88365 INSITU HYBRIDIZATION (FISH): CPT | Mod: 26

## 2025-05-09 PROCEDURE — 88307 TISSUE EXAM BY PATHOLOGIST: CPT | Mod: 26

## 2025-05-09 PROCEDURE — G0545: CPT

## 2025-05-09 PROCEDURE — 99222 1ST HOSP IP/OBS MODERATE 55: CPT | Mod: GC

## 2025-05-09 PROCEDURE — 88364 INSITU HYBRIDIZATION (FISH): CPT | Mod: 26

## 2025-05-09 PROCEDURE — 88341 IMHCHEM/IMCYTCHM EA ADD ANTB: CPT | Mod: 26

## 2025-05-09 DEVICE — BONE FILLER BIOCOMPOSITE STIMULAN RAPID CURE 10CC: Type: IMPLANTABLE DEVICE | Status: FUNCTIONAL

## 2025-05-09 RX ORDER — ASPIRIN 325 MG
81 TABLET ORAL DAILY
Refills: 0 | Status: DISCONTINUED | OUTPATIENT
Start: 2025-05-09 | End: 2025-05-15

## 2025-05-09 RX ORDER — ONDANSETRON HCL/PF 4 MG/2 ML
4 VIAL (ML) INJECTION EVERY 6 HOURS
Refills: 0 | Status: DISCONTINUED | OUTPATIENT
Start: 2025-05-09 | End: 2025-05-15

## 2025-05-09 RX ORDER — SENNA 187 MG
2 TABLET ORAL AT BEDTIME
Refills: 0 | Status: DISCONTINUED | OUTPATIENT
Start: 2025-05-09 | End: 2025-05-15

## 2025-05-09 RX ORDER — ACETAMINOPHEN 500 MG/5ML
650 LIQUID (ML) ORAL EVERY 6 HOURS
Refills: 0 | Status: DISCONTINUED | OUTPATIENT
Start: 2025-05-09 | End: 2025-05-11

## 2025-05-09 RX ORDER — OXYCODONE HYDROCHLORIDE 30 MG/1
2.5 TABLET ORAL EVERY 4 HOURS
Refills: 0 | Status: DISCONTINUED | OUTPATIENT
Start: 2025-05-09 | End: 2025-05-11

## 2025-05-09 RX ORDER — HYDROMORPHONE/SOD CHLOR,ISO/PF 2 MG/10 ML
0.5 SYRINGE (ML) INJECTION
Refills: 0 | Status: DISCONTINUED | OUTPATIENT
Start: 2025-05-09 | End: 2025-05-09

## 2025-05-09 RX ORDER — LOSARTAN POTASSIUM 100 MG/1
12.5 TABLET, FILM COATED ORAL DAILY
Refills: 0 | Status: DISCONTINUED | OUTPATIENT
Start: 2025-05-09 | End: 2025-05-15

## 2025-05-09 RX ORDER — DIPHENHYDRAMINE HCL 12.5MG/5ML
1 ELIXIR ORAL
Refills: 0 | DISCHARGE

## 2025-05-09 RX ORDER — POLYETHYLENE GLYCOL 3350 17 G/17G
17 POWDER, FOR SOLUTION ORAL AT BEDTIME
Refills: 0 | Status: DISCONTINUED | OUTPATIENT
Start: 2025-05-09 | End: 2025-05-15

## 2025-05-09 RX ORDER — MAGNESIUM HYDROXIDE 400 MG/5ML
30 SUSPENSION ORAL DAILY
Refills: 0 | Status: DISCONTINUED | OUTPATIENT
Start: 2025-05-09 | End: 2025-05-15

## 2025-05-09 RX ORDER — CEFAZOLIN SODIUM IN 0.9 % NACL 3 G/100 ML
2000 INTRAVENOUS SOLUTION, PIGGYBACK (ML) INTRAVENOUS EVERY 8 HOURS
Refills: 0 | Status: DISCONTINUED | OUTPATIENT
Start: 2025-05-09 | End: 2025-05-15

## 2025-05-09 RX ORDER — ONDANSETRON HCL/PF 4 MG/2 ML
4 VIAL (ML) INJECTION ONCE
Refills: 0 | Status: DISCONTINUED | OUTPATIENT
Start: 2025-05-09 | End: 2025-05-09

## 2025-05-09 RX ORDER — ATORVASTATIN CALCIUM 80 MG/1
10 TABLET, FILM COATED ORAL AT BEDTIME
Refills: 0 | Status: DISCONTINUED | OUTPATIENT
Start: 2025-05-09 | End: 2025-05-15

## 2025-05-09 RX ORDER — ACETAMINOPHEN 500 MG/5ML
1000 LIQUID (ML) ORAL ONCE
Refills: 0 | Status: COMPLETED | OUTPATIENT
Start: 2025-05-09 | End: 2025-05-09

## 2025-05-09 RX ORDER — OXYCODONE HYDROCHLORIDE 30 MG/1
5 TABLET ORAL ONCE
Refills: 0 | Status: DISCONTINUED | OUTPATIENT
Start: 2025-05-09 | End: 2025-05-09

## 2025-05-09 RX ORDER — TRAMADOL HYDROCHLORIDE 50 MG/1
50 TABLET, FILM COATED ORAL EVERY 6 HOURS
Refills: 0 | Status: DISCONTINUED | OUTPATIENT
Start: 2025-05-09 | End: 2025-05-15

## 2025-05-09 RX ORDER — LOSARTAN POTASSIUM 100 MG/1
0.5 TABLET, FILM COATED ORAL
Refills: 0 | DISCHARGE

## 2025-05-09 RX ORDER — OXYCODONE HYDROCHLORIDE 30 MG/1
5 TABLET ORAL EVERY 4 HOURS
Refills: 0 | Status: DISCONTINUED | OUTPATIENT
Start: 2025-05-09 | End: 2025-05-11

## 2025-05-09 RX ADMIN — Medication 81 MILLIGRAM(S): at 18:51

## 2025-05-09 RX ADMIN — ATORVASTATIN CALCIUM 10 MILLIGRAM(S): 80 TABLET, FILM COATED ORAL at 21:16

## 2025-05-09 RX ADMIN — Medication 100 MILLIGRAM(S): at 19:16

## 2025-05-09 RX ADMIN — Medication 2 TABLET(S): at 21:16

## 2025-05-09 RX ADMIN — SODIUM CHLORIDE 30 MILLILITER(S): 9 INJECTION, SOLUTION INTRAVENOUS at 15:51

## 2025-05-09 RX ADMIN — POLYETHYLENE GLYCOL 3350 17 GRAM(S): 17 POWDER, FOR SOLUTION ORAL at 21:16

## 2025-05-09 RX ADMIN — Medication 400 MILLIGRAM(S): at 15:51

## 2025-05-09 RX ADMIN — Medication 1000 MILLIGRAM(S): at 16:15

## 2025-05-09 NOTE — ASU PREOP CHECKLIST - HAIR REMOVAL
Outreach attempt was made to schedule a Medicare Wellness Visit.  We have made numerous attempts to contact patient were made, letter sent.   hair removal not indicated

## 2025-05-09 NOTE — BRIEF OPERATIVE NOTE - NSEVIDNCEINFORABSCESSFT_GEN_ALL_CORE
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

## 2025-05-09 NOTE — CONSULT NOTE ADULT - SUBJECTIVE AND OBJECTIVE BOX
Patient is a 64 year old female with PMH of recurrent L distal tibia Kali's abcess ( s/p I&D on 2/9/2015 with cultures with MSSA and dischagred on Bactrim through 3/23/2015 )  who presents for scheduled left distal tibia biopsy, I&D of abscess, sequestrectomy, and insertion of stimulan beads ( performed on 5/9). OR cultures pending. Purulence noted in op note. ,followed up with ortho  due to recent progressive L ankle pain at the prior surgical site. Feeling slightly better now with Tylenol but continues to have severe left lower leg pain. Denies any fevers.    Outpatient MRI from 4/25/25 showing: High STIR signal structure within the distal tibia demonstrating peripheral enhancement and diffuse surrounding bone marrow edema and postcontrast enhancement as described. This structure is of uncertain etiology. Given the clinical history, a primary consideration is a Kali abscess. Outpatient CRP from 4/30 11.     Abx:   Cefazolin 2 g IVPB q8h (5/9-)     REVIEW OF SYSTEMS  pending full examination    prior hospital charts reviewed [V]  primary team notes reviewed [V]  other consultant notes reviewed [V]    PAST MEDICAL & SURGICAL HISTORY:  Hypertension      Hyperlipidemia      Chronic osteomyelitis      Left ankle pain      H/O colonoscopy      History of incision and drainage          SOCIAL HISTORY:  Denied smoking/vaping/alcohol/recreational drug use    FAMILY HISTORY:  Family history of breast cancer        Allergies  No Known Allergies        ANTIMICROBIALS:  ceFAZolin   IVPB 2000 every 8 hours      ANTIMICROBIALS (past 90 days):  MEDICATIONS  (STANDING):        OTHER MEDS:   MEDICATIONS  (STANDING):  acetaminophen     Tablet .. 650 every 6 hours PRN  aspirin  chewable 81 daily  atorvastatin 10 at bedtime  HYDROmorphone  Injectable 0.5 every 10 minutes PRN  losartan 12.5 daily  magnesium hydroxide Suspension 30 daily PRN  ondansetron Injectable 4 every 6 hours PRN  ondansetron Injectable 4 once PRN  oxyCODONE    IR 5 once PRN  oxyCODONE    IR 2.5 every 4 hours PRN  oxyCODONE    IR 5 every 4 hours PRN  pantoprazole    Tablet 40 before breakfast  polyethylene glycol 3350 17 at bedtime  senna 2 at bedtime  traMADol 50 every 6 hours PRN      VITALS:  Vital Signs Last 24 Hrs  T(F): 97 (05-09-25 @ 16:15), Max: 97.7 (05-09-25 @ 13:30)    Vital Signs Last 24 Hrs  HR: 83 (05-09-25 @ 16:15) (80 - 105)  BP: 112/62 (05-09-25 @ 16:15) (112/62 - 135/74)  RR: 20 (05-09-25 @ 16:15)  SpO2: 96% (05-09-25 @ 16:15) (94% - 98%)  Wt(kg): --    EXAM:  pending full examination      Labs:            WBC Trend:          Creatine Trend:      Liver Biochemical Testing Trend:      Trend LDH          MICROBIOLOGY:                                                      RADIOLOGY:  imaging below personally reviewed   [facility]  [id number]. Patient is a 64 year old female with PMH of recurrent L distal tibia Kali's abcesss ( s/p I&D on 2/9/2015 with cultures with MSSA and discharged on Bactrim to be completed through 3/23/2015 ) who presents for scheduled left distal tibia biopsy, I&D of abscess, sequestrectomy, and insertion of stimulan beads (performed on 5/9). OR cultures pending. Purulence noted in op note. Patient states she has been having left lower leg pain for the past 3 weeks with increasing difficulty walking so she followed up with ortho. Outpatient MRI from 4/25/25 showing: High STIR signal structure within the distal tibia demonstrating peripheral enhancement and diffuse surrounding bone marrow edema and postcontrast enhancement as described. This structure is of uncertain etiology. Given the clinical history, a primary consideration is a Kali abscess. Outpatient CRP from 4/30 noted to 11 but no leukocytosis.        Abx:   Cefazolin 2 g IVPB q8h (5/9-)     REVIEW OF SYSTEMS  pending full examination    prior hospital charts reviewed [V]  primary team notes reviewed [V]  other consultant notes reviewed [V]    PAST MEDICAL & SURGICAL HISTORY:  Hypertension      Hyperlipidemia      Chronic osteomyelitis      Left ankle pain      H/O colonoscopy      History of incision and drainage          SOCIAL HISTORY:  Denied smoking/vaping/alcohol/recreational drug use    FAMILY HISTORY:  Family history of breast cancer        Allergies  No Known Allergies        ANTIMICROBIALS:  ceFAZolin   IVPB 2000 every 8 hours      ANTIMICROBIALS (past 90 days):  MEDICATIONS  (STANDING):        OTHER MEDS:   MEDICATIONS  (STANDING):  acetaminophen     Tablet .. 650 every 6 hours PRN  aspirin  chewable 81 daily  atorvastatin 10 at bedtime  HYDROmorphone  Injectable 0.5 every 10 minutes PRN  losartan 12.5 daily  magnesium hydroxide Suspension 30 daily PRN  ondansetron Injectable 4 every 6 hours PRN  ondansetron Injectable 4 once PRN  oxyCODONE    IR 5 once PRN  oxyCODONE    IR 2.5 every 4 hours PRN  oxyCODONE    IR 5 every 4 hours PRN  pantoprazole    Tablet 40 before breakfast  polyethylene glycol 3350 17 at bedtime  senna 2 at bedtime  traMADol 50 every 6 hours PRN      VITALS:  Vital Signs Last 24 Hrs  T(F): 97 (05-09-25 @ 16:15), Max: 97.7 (05-09-25 @ 13:30)    Vital Signs Last 24 Hrs  HR: 83 (05-09-25 @ 16:15) (80 - 105)  BP: 112/62 (05-09-25 @ 16:15) (112/62 - 135/74)  RR: 20 (05-09-25 @ 16:15)  SpO2: 96% (05-09-25 @ 16:15) (94% - 98%)  Wt(kg): --    EXAM:  pending full examination      Labs:            WBC Trend:          Creatine Trend:      Liver Biochemical Testing Trend:      Trend LDH          MICROBIOLOGY:                                                      RADIOLOGY:  imaging below personally reviewed   [facility]  [id number]. Patient is a 64 year old female with PMH of recurrent L distal tibia Kali's abcess ( s/p I&D on 2/9/2015 with cultures with MSSA and discharged on Bactrim to be completed through 3/23/2015 ) who presents for scheduled left distal tibia biopsy, I&D of abscess, sequestrectomy, and insertion of stimulan beads (performed on 5/9). OR cultures pending. Purulence noted in op note. Patient states she has been having left lower leg pain for the past 3 weeks with increasing difficulty walking so she followed up with ortho. Outpatient MRI from 4/25/25 showing: High STIR signal structure within the distal tibia demonstrating peripheral enhancement and diffuse surrounding bone marrow edema and postcontrast enhancement as described. This structure is of uncertain etiology. Given the clinical history, a primary consideration is a Kali abscess. Outpatient CRP from 4/30 noted to 11 but no leukocytosis. Patient denies fevers and chills. Denies any recent trauma but reports she has had on and off pain to the leg after an injury at the age of 10; states she was running and her leg fell onto some rocks. Never sought medical attention for her leg at the time and denies ever having any surgeries to her leg until 2015.        Abx:   Cefazolin 2 g IVPB q8h (5/9-)     REVIEW OF SYSTEMS  Constitutional: No fevers, No chills  Respiratory: No cough, no SOB  Cardiovascular:  No chest pain, No palpitations   Gastrointestinal: No pain, No nausea, No vomiting, No diarrhea, No constipation	  Genitourinary: No dysuria, No frequency, No hesitancy, No flank pain  MSK: , No back pain, No edema  Neurological: No HA, no weakness, no seizures, no AMS     prior hospital charts reviewed [V]  primary team notes reviewed [V]  other consultant notes reviewed [V]    PAST MEDICAL & SURGICAL HISTORY:  Hypertension      Hyperlipidemia      Chronic osteomyelitis      Left ankle pain      H/O colonoscopy      History of incision and drainage          SOCIAL HISTORY:  Denied smoking/vaping/alcohol/recreational drug use  -born in [facility]     FAMILY HISTORY:  Family history of breast cancer        Allergies  No Known Allergies        ANTIMICROBIALS:  ceFAZolin   IVPB 2000 every 8 hours      ANTIMICROBIALS (past 90 days):  MEDICATIONS  (STANDING):        OTHER MEDS:   MEDICATIONS  (STANDING):  acetaminophen     Tablet .. 650 every 6 hours PRN  aspirin  chewable 81 daily  atorvastatin 10 at bedtime  HYDROmorphone  Injectable 0.5 every 10 minutes PRN  losartan 12.5 daily  magnesium hydroxide Suspension 30 daily PRN  ondansetron Injectable 4 every 6 hours PRN  ondansetron Injectable 4 once PRN  oxyCODONE    IR 5 once PRN  oxyCODONE    IR 2.5 every 4 hours PRN  oxyCODONE    IR 5 every 4 hours PRN  pantoprazole    Tablet 40 before breakfast  polyethylene glycol 3350 17 at bedtime  senna 2 at bedtime  traMADol 50 every 6 hours PRN      VITALS:  Vital Signs Last 24 Hrs  T(F): 97 (05-09-25 @ 16:15), Max: 97.7 (05-09-25 @ 13:30)    Vital Signs Last 24 Hrs  HR: 83 (05-09-25 @ 16:15) (80 - 105)  BP: 112/62 (05-09-25 @ 16:15) (112/62 - 135/74)  RR: 20 (05-09-25 @ 16:15)  SpO2: 96% (05-09-25 @ 16:15) (94% - 98%)  Wt(kg): --    EXAM:  General: Patient appears comfortable, no acute distress  HEENT: NCAT  CV: +S1/S2, RRR, no M/R/G  Lungs: No respiratory distress, CTA b/l, no wheezing, rales or rhonchi  Abd:  BS4+, Soft, NTND, no guarding  : No suprapubic tenderness  Neuro: AAOx3. No focal deficits noted.   Ext: LLE: wrapped in gauze and ace bandage   Skin: No rash, no phlebitis, No erythema       Labs:            WBC Trend:          Creatine Trend:      Liver Biochemical Testing Trend:      Trend LDH          MICROBIOLOGY:                                                      RADIOLOGY:  No imaging to review.

## 2025-05-09 NOTE — PATIENT PROFILE ADULT - FALL HARM RISK - RISK INTERVENTIONS
Assistance OOB with selected safe patient handling equipment/Assistance with ambulation/Communicate Fall Risk and Risk Factors to all staff, patient, and family/Discuss with provider need for PT consult/Monitor gait and stability/Provide patient with walking aids - walker, cane, crutches/Reinforce activity limits and safety measures with patient and family/Sit up slowly, dangle for a short time, stand at bedside before walking/Use of alarms - bed, chair and/or voice tab/Visual Cue: Yellow wristband/Bed in lowest position, wheels locked, appropriate side rails in place/Call bell, personal items and telephone in reach/Instruct patient to call for assistance before getting out of bed or chair/Non-slip footwear when patient is out of bed/[place] to call system/Physically safe environment - no spills, clutter or unnecessary equipment/Purposeful Proactive Rounding/Room/bathroom lighting operational, light cord in reach

## 2025-05-09 NOTE — ASU PREOP CHECKLIST - TAMPON REMOVED
-- Message is from the [facility]--    Reason for Call:Patient was given a referral to [facility] ,however they are not in network and patient needs a new referral    Caller Information       Type Contact Phone    05/08/2019 04:09 PM Phone (Incoming) Noelle Corea (Self) [phone] (M)          Alternative phone number: N/A    Turnaround time given to caller:   \"This message will be sent to [state Provider's name]. The clinical team will fulfill your request as soon as they review your message.\"    
n/a

## 2025-05-09 NOTE — BRIEF OPERATIVE NOTE - NSICDXBRIEFPROCEDURE_GEN_ALL_CORE_FT
PROCEDURES:  Irrigation and debridement of left tibia 09-May-2025 13:41:22 Biopsy, sequestrectomy, stimulan bead placement Marc Salgado

## 2025-05-10 LAB
ANION GAP SERPL CALC-SCNC: 14 MMOL/L — SIGNIFICANT CHANGE UP (ref 7–14)
BUN SERPL-MCNC: 20 MG/DL — SIGNIFICANT CHANGE UP (ref 7–23)
CALCIUM SERPL-MCNC: 9.3 MG/DL — SIGNIFICANT CHANGE UP (ref 8.4–10.5)
CHLORIDE SERPL-SCNC: 101 MMOL/L — SIGNIFICANT CHANGE UP (ref 98–107)
CO2 SERPL-SCNC: 23 MMOL/L — SIGNIFICANT CHANGE UP (ref 22–31)
CREAT SERPL-MCNC: 0.64 MG/DL — SIGNIFICANT CHANGE UP (ref 0.5–1.3)
EGFR: 97 ML/MIN/1.73M2 — SIGNIFICANT CHANGE UP
EGFR: 97 ML/MIN/1.73M2 — SIGNIFICANT CHANGE UP
GLUCOSE SERPL-MCNC: 109 MG/DL — HIGH (ref 70–99)
GRAM STN FLD: ABNORMAL
GRAM STN FLD: ABNORMAL
GRAM STN FLD: SIGNIFICANT CHANGE UP
HCT VFR BLD CALC: 36.1 % — SIGNIFICANT CHANGE UP (ref 34.5–45)
HGB BLD-MCNC: 11.8 G/DL — SIGNIFICANT CHANGE UP (ref 11.5–15.5)
MCHC RBC-ENTMCNC: 28.9 PG — SIGNIFICANT CHANGE UP (ref 27–34)
MCHC RBC-ENTMCNC: 32.7 G/DL — SIGNIFICANT CHANGE UP (ref 32–36)
MCV RBC AUTO: 88.3 FL — SIGNIFICANT CHANGE UP (ref 80–100)
NRBC # BLD AUTO: 0 K/UL — SIGNIFICANT CHANGE UP (ref 0–0)
NRBC # FLD: 0 K/UL — SIGNIFICANT CHANGE UP (ref 0–0)
NRBC BLD AUTO-RTO: 0 /100 WBCS — SIGNIFICANT CHANGE UP (ref 0–0)
PLATELET # BLD AUTO: 444 K/UL — HIGH (ref 150–400)
POTASSIUM SERPL-MCNC: 3.6 MMOL/L — SIGNIFICANT CHANGE UP (ref 3.5–5.3)
POTASSIUM SERPL-SCNC: 3.6 MMOL/L — SIGNIFICANT CHANGE UP (ref 3.5–5.3)
RBC # BLD: 4.09 M/UL — SIGNIFICANT CHANGE UP (ref 3.8–5.2)
RBC # FLD: 12.5 % — SIGNIFICANT CHANGE UP (ref 10.3–14.5)
SODIUM SERPL-SCNC: 138 MMOL/L — SIGNIFICANT CHANGE UP (ref 135–145)
SPECIMEN SOURCE: SIGNIFICANT CHANGE UP
WBC # BLD: 11.11 K/UL — HIGH (ref 3.8–10.5)
WBC # FLD AUTO: 11.11 K/UL — HIGH (ref 3.8–10.5)

## 2025-05-10 RX ADMIN — ATORVASTATIN CALCIUM 10 MILLIGRAM(S): 80 TABLET, FILM COATED ORAL at 21:02

## 2025-05-10 RX ADMIN — Medication 100 MILLIGRAM(S): at 19:30

## 2025-05-10 RX ADMIN — LOSARTAN POTASSIUM 12.5 MILLIGRAM(S): 100 TABLET, FILM COATED ORAL at 05:20

## 2025-05-10 RX ADMIN — Medication 100 MILLIGRAM(S): at 05:19

## 2025-05-10 RX ADMIN — OXYCODONE HYDROCHLORIDE 5 MILLIGRAM(S): 30 TABLET ORAL at 21:02

## 2025-05-10 RX ADMIN — OXYCODONE HYDROCHLORIDE 5 MILLIGRAM(S): 30 TABLET ORAL at 22:02

## 2025-05-10 RX ADMIN — POLYETHYLENE GLYCOL 3350 17 GRAM(S): 17 POWDER, FOR SOLUTION ORAL at 21:02

## 2025-05-10 RX ADMIN — Medication 100 MILLIGRAM(S): at 13:08

## 2025-05-10 RX ADMIN — Medication 2 TABLET(S): at 21:02

## 2025-05-10 RX ADMIN — Medication 40 MILLIGRAM(S): at 05:19

## 2025-05-10 RX ADMIN — Medication 81 MILLIGRAM(S): at 13:10

## 2025-05-10 NOTE — PROVIDER CONTACT NOTE (CRITICAL VALUE NOTIFICATION) - TEST AND RESULT REPORTED:
Tissue culture and abscess culture positive for gram + cocci clusters and few polymorphunuclear leukocytes

## 2025-05-10 NOTE — PHYSICAL THERAPY INITIAL EVALUATION ADULT - LEVEL OF INDEPENDENCE: GAIT, REHAB EVAL
Held due to pt complaints of lack of sensation in left LE. Pt eagerly waiting for sensation to return to be able to ambulate. Text pg sent to Ortho resident notifying of sensation limits, ZAHRA Zimmerman aware as well.

## 2025-05-10 NOTE — PHYSICAL THERAPY INITIAL EVALUATION ADULT - LEVEL OF INDEPENDENCE: SIT/STAND, REHAB EVAL
Pt requesting to attempt to stand. Pt educated on not attempting to ambulate at this time due to pt complaints of lack of sensation in left LE. Text pt sent to Ortho resident, Thomas Staples, updating on decreased sensation in left LE./contact guard

## 2025-05-10 NOTE — PROVIDER CONTACT NOTE (CRITICAL VALUE NOTIFICATION) - ASSESSMENT
Patient AxO 4, without complaints of pain. Patient does complain of LLE numbness and loss of sensation. However, was told by previous shift that those s/s to be expected due to nerve block. Also informed ortho provider.

## 2025-05-10 NOTE — PROVIDER CONTACT NOTE (CRITICAL VALUE NOTIFICATION) - BACKGROUND
Patient admitted for chronic osteomyelitis of L tibia and fibula with hx of hyperlipidemia and HTN. Post-op dx Kali's abscess in tibia.

## 2025-05-10 NOTE — PHYSICAL THERAPY INITIAL EVALUATION ADULT - ADDITIONAL COMMENTS
Pt states she lives in a house with 2 steps to enter and 1 step to bedroom. Prior to admission, pt was ambulating independently without any devices.  Post PT evaluation, pt left with head of bed elevated to 30 degrees, alarm on, call bell and remote within reach, all precautions maintained, NAD. RN aware.

## 2025-05-10 NOTE — PROVIDER CONTACT NOTE (CRITICAL VALUE NOTIFICATION) - SITUATION
Lab results for tissue culture and abscess culture returned positive for gram + cocci in clusters w/ few polymophunuclear leukocytes.

## 2025-05-10 NOTE — PHYSICAL THERAPY INITIAL EVALUATION ADULT - GROSSLY INTACT, SENSORY
Pt complains of decreased sensation in left LE, text pg sent to Ortho resident notifying of above. ZAHRA marcelino.

## 2025-05-11 LAB
ANION GAP SERPL CALC-SCNC: 13 MMOL/L — SIGNIFICANT CHANGE UP (ref 7–14)
BUN SERPL-MCNC: 22 MG/DL — SIGNIFICANT CHANGE UP (ref 7–23)
CALCIUM SERPL-MCNC: 9.1 MG/DL — SIGNIFICANT CHANGE UP (ref 8.4–10.5)
CHLORIDE SERPL-SCNC: 102 MMOL/L — SIGNIFICANT CHANGE UP (ref 98–107)
CO2 SERPL-SCNC: 26 MMOL/L — SIGNIFICANT CHANGE UP (ref 22–31)
CREAT SERPL-MCNC: 0.66 MG/DL — SIGNIFICANT CHANGE UP (ref 0.5–1.3)
EGFR: 97 ML/MIN/1.73M2 — SIGNIFICANT CHANGE UP
EGFR: 97 ML/MIN/1.73M2 — SIGNIFICANT CHANGE UP
GLUCOSE SERPL-MCNC: 88 MG/DL — SIGNIFICANT CHANGE UP (ref 70–99)
HCT VFR BLD CALC: 34 % — LOW (ref 34.5–45)
HGB BLD-MCNC: 11.1 G/DL — LOW (ref 11.5–15.5)
MCHC RBC-ENTMCNC: 28.9 PG — SIGNIFICANT CHANGE UP (ref 27–34)
MCHC RBC-ENTMCNC: 32.6 G/DL — SIGNIFICANT CHANGE UP (ref 32–36)
MCV RBC AUTO: 88.5 FL — SIGNIFICANT CHANGE UP (ref 80–100)
NRBC # BLD AUTO: 0 K/UL — SIGNIFICANT CHANGE UP (ref 0–0)
NRBC # FLD: 0 K/UL — SIGNIFICANT CHANGE UP (ref 0–0)
NRBC BLD AUTO-RTO: 0 /100 WBCS — SIGNIFICANT CHANGE UP (ref 0–0)
PLATELET # BLD AUTO: 389 K/UL — SIGNIFICANT CHANGE UP (ref 150–400)
POTASSIUM SERPL-MCNC: 3.3 MMOL/L — LOW (ref 3.5–5.3)
POTASSIUM SERPL-SCNC: 3.3 MMOL/L — LOW (ref 3.5–5.3)
RBC # BLD: 3.84 M/UL — SIGNIFICANT CHANGE UP (ref 3.8–5.2)
RBC # FLD: 13 % — SIGNIFICANT CHANGE UP (ref 10.3–14.5)
SODIUM SERPL-SCNC: 141 MMOL/L — SIGNIFICANT CHANGE UP (ref 135–145)
WBC # BLD: 7.86 K/UL — SIGNIFICANT CHANGE UP (ref 3.8–10.5)
WBC # FLD AUTO: 7.86 K/UL — SIGNIFICANT CHANGE UP (ref 3.8–10.5)

## 2025-05-11 RX ORDER — ACETAMINOPHEN 500 MG/5ML
975 LIQUID (ML) ORAL EVERY 8 HOURS
Refills: 0 | Status: DISCONTINUED | OUTPATIENT
Start: 2025-05-11 | End: 2025-05-15

## 2025-05-11 RX ORDER — OXYCODONE HYDROCHLORIDE 30 MG/1
10 TABLET ORAL ONCE
Refills: 0 | Status: DISCONTINUED | OUTPATIENT
Start: 2025-05-11 | End: 2025-05-11

## 2025-05-11 RX ORDER — OXYCODONE HYDROCHLORIDE 30 MG/1
5 TABLET ORAL EVERY 4 HOURS
Refills: 0 | Status: DISCONTINUED | OUTPATIENT
Start: 2025-05-11 | End: 2025-05-15

## 2025-05-11 RX ORDER — OXYCODONE HYDROCHLORIDE 30 MG/1
10 TABLET ORAL EVERY 4 HOURS
Refills: 0 | Status: DISCONTINUED | OUTPATIENT
Start: 2025-05-11 | End: 2025-05-15

## 2025-05-11 RX ADMIN — Medication 100 MILLIGRAM(S): at 12:28

## 2025-05-11 RX ADMIN — Medication 650 MILLIGRAM(S): at 06:45

## 2025-05-11 RX ADMIN — OXYCODONE HYDROCHLORIDE 10 MILLIGRAM(S): 30 TABLET ORAL at 03:56

## 2025-05-11 RX ADMIN — Medication 975 MILLIGRAM(S): at 22:44

## 2025-05-11 RX ADMIN — Medication 650 MILLIGRAM(S): at 05:46

## 2025-05-11 RX ADMIN — LOSARTAN POTASSIUM 12.5 MILLIGRAM(S): 100 TABLET, FILM COATED ORAL at 05:46

## 2025-05-11 RX ADMIN — Medication 4 MILLIGRAM(S): at 21:59

## 2025-05-11 RX ADMIN — Medication 40 MILLIGRAM(S): at 05:46

## 2025-05-11 RX ADMIN — Medication 100 MILLIGRAM(S): at 21:45

## 2025-05-11 RX ADMIN — Medication 2 TABLET(S): at 21:45

## 2025-05-11 RX ADMIN — OXYCODONE HYDROCHLORIDE 5 MILLIGRAM(S): 30 TABLET ORAL at 18:56

## 2025-05-11 RX ADMIN — Medication 975 MILLIGRAM(S): at 21:44

## 2025-05-11 RX ADMIN — Medication 100 MILLIGRAM(S): at 03:57

## 2025-05-11 RX ADMIN — Medication 81 MILLIGRAM(S): at 12:28

## 2025-05-11 RX ADMIN — POLYETHYLENE GLYCOL 3350 17 GRAM(S): 17 POWDER, FOR SOLUTION ORAL at 21:45

## 2025-05-11 RX ADMIN — OXYCODONE HYDROCHLORIDE 10 MILLIGRAM(S): 30 TABLET ORAL at 04:55

## 2025-05-11 RX ADMIN — OXYCODONE HYDROCHLORIDE 10 MILLIGRAM(S): 30 TABLET ORAL at 21:57

## 2025-05-11 RX ADMIN — ATORVASTATIN CALCIUM 10 MILLIGRAM(S): 80 TABLET, FILM COATED ORAL at 21:45

## 2025-05-11 RX ADMIN — OXYCODONE HYDROCHLORIDE 10 MILLIGRAM(S): 30 TABLET ORAL at 22:44

## 2025-05-11 RX ADMIN — OXYCODONE HYDROCHLORIDE 5 MILLIGRAM(S): 30 TABLET ORAL at 17:56

## 2025-05-11 RX ADMIN — Medication 4 MILLIGRAM(S): at 04:42

## 2025-05-11 NOTE — OCCUPATIONAL THERAPY INITIAL EVALUATION ADULT - DIAGNOSIS, OT EVAL
Pt with impaired strength in left LE and endurance impacting ability to complete ADLs, IADLs, functional mobility/transfers.

## 2025-05-11 NOTE — OCCUPATIONAL THERAPY INITIAL EVALUATION ADULT - LIVES WITH, PROFILE
Pt resides in a house with her spouse, 2 steps to enter, 1 step inside to bedroom, bathroom has a walk-in shower./spouse

## 2025-05-11 NOTE — OCCUPATIONAL THERAPY INITIAL EVALUATION ADULT - GENERAL OBSERVATIONS, REHAB EVAL
Pt received supine in bed in NAD, + ace bandage left LE clean/dry/intact, all lines intact, +spouse present at bedside. Pt OK to be seen for OT per ZAHRA Spring. SpO2 99% on RA.

## 2025-05-11 NOTE — OCCUPATIONAL THERAPY INITIAL EVALUATION ADULT - NSOTDISCHREC_GEN_A_CORE
Recommend supervision/assist with functional activities which pt reports spouse can provide./No skilled OT needs

## 2025-05-12 DIAGNOSIS — I10 ESSENTIAL (PRIMARY) HYPERTENSION: ICD-10-CM

## 2025-05-12 LAB
ANION GAP SERPL CALC-SCNC: 10 MMOL/L — SIGNIFICANT CHANGE UP (ref 7–14)
BUN SERPL-MCNC: 16 MG/DL — SIGNIFICANT CHANGE UP (ref 7–23)
CALCIUM SERPL-MCNC: 9 MG/DL — SIGNIFICANT CHANGE UP (ref 8.4–10.5)
CHLORIDE SERPL-SCNC: 102 MMOL/L — SIGNIFICANT CHANGE UP (ref 98–107)
CO2 SERPL-SCNC: 28 MMOL/L — SIGNIFICANT CHANGE UP (ref 22–31)
CREAT SERPL-MCNC: 0.63 MG/DL — SIGNIFICANT CHANGE UP (ref 0.5–1.3)
EGFR: 98 ML/MIN/1.73M2 — SIGNIFICANT CHANGE UP
EGFR: 98 ML/MIN/1.73M2 — SIGNIFICANT CHANGE UP
GLUCOSE SERPL-MCNC: 99 MG/DL — SIGNIFICANT CHANGE UP (ref 70–99)
HCT VFR BLD CALC: 34 % — LOW (ref 34.5–45)
HGB BLD-MCNC: 11.1 G/DL — LOW (ref 11.5–15.5)
MCHC RBC-ENTMCNC: 29.1 PG — SIGNIFICANT CHANGE UP (ref 27–34)
MCHC RBC-ENTMCNC: 32.6 G/DL — SIGNIFICANT CHANGE UP (ref 32–36)
MCV RBC AUTO: 89 FL — SIGNIFICANT CHANGE UP (ref 80–100)
NRBC # BLD AUTO: 0 K/UL — SIGNIFICANT CHANGE UP (ref 0–0)
NRBC # FLD: 0 K/UL — SIGNIFICANT CHANGE UP (ref 0–0)
NRBC BLD AUTO-RTO: 0 /100 WBCS — SIGNIFICANT CHANGE UP (ref 0–0)
PLATELET # BLD AUTO: 363 K/UL — SIGNIFICANT CHANGE UP (ref 150–400)
POTASSIUM SERPL-MCNC: 3.6 MMOL/L — SIGNIFICANT CHANGE UP (ref 3.5–5.3)
POTASSIUM SERPL-SCNC: 3.6 MMOL/L — SIGNIFICANT CHANGE UP (ref 3.5–5.3)
RBC # BLD: 3.82 M/UL — SIGNIFICANT CHANGE UP (ref 3.8–5.2)
RBC # FLD: 13 % — SIGNIFICANT CHANGE UP (ref 10.3–14.5)
SODIUM SERPL-SCNC: 140 MMOL/L — SIGNIFICANT CHANGE UP (ref 135–145)
WBC # BLD: 5.35 K/UL — SIGNIFICANT CHANGE UP (ref 3.8–10.5)
WBC # FLD AUTO: 5.35 K/UL — SIGNIFICANT CHANGE UP (ref 3.8–10.5)

## 2025-05-12 PROCEDURE — 99232 SBSQ HOSP IP/OBS MODERATE 35: CPT

## 2025-05-12 PROCEDURE — 99223 1ST HOSP IP/OBS HIGH 75: CPT | Mod: GC

## 2025-05-12 PROCEDURE — G0545: CPT

## 2025-05-12 RX ADMIN — OXYCODONE HYDROCHLORIDE 5 MILLIGRAM(S): 30 TABLET ORAL at 19:30

## 2025-05-12 RX ADMIN — Medication 100 MILLIGRAM(S): at 13:18

## 2025-05-12 RX ADMIN — POLYETHYLENE GLYCOL 3350 17 GRAM(S): 17 POWDER, FOR SOLUTION ORAL at 21:37

## 2025-05-12 RX ADMIN — Medication 975 MILLIGRAM(S): at 22:40

## 2025-05-12 RX ADMIN — Medication 100 MILLIGRAM(S): at 21:37

## 2025-05-12 RX ADMIN — LOSARTAN POTASSIUM 12.5 MILLIGRAM(S): 100 TABLET, FILM COATED ORAL at 05:11

## 2025-05-12 RX ADMIN — Medication 975 MILLIGRAM(S): at 13:19

## 2025-05-12 RX ADMIN — Medication 975 MILLIGRAM(S): at 21:37

## 2025-05-12 RX ADMIN — ATORVASTATIN CALCIUM 10 MILLIGRAM(S): 80 TABLET, FILM COATED ORAL at 21:37

## 2025-05-12 RX ADMIN — Medication 975 MILLIGRAM(S): at 13:23

## 2025-05-12 RX ADMIN — Medication 975 MILLIGRAM(S): at 06:05

## 2025-05-12 RX ADMIN — OXYCODONE HYDROCHLORIDE 5 MILLIGRAM(S): 30 TABLET ORAL at 18:30

## 2025-05-12 RX ADMIN — Medication 100 MILLIGRAM(S): at 05:10

## 2025-05-12 RX ADMIN — Medication 975 MILLIGRAM(S): at 05:10

## 2025-05-12 RX ADMIN — Medication 2 TABLET(S): at 21:37

## 2025-05-12 RX ADMIN — Medication 81 MILLIGRAM(S): at 13:19

## 2025-05-12 RX ADMIN — Medication 40 MILLIGRAM(S): at 05:11

## 2025-05-12 NOTE — CONSULT NOTE ADULT - ATTENDING COMMENTS
Patient seen and examined. Chart with pertinent labs and imaging reviewed.  POD#1 repeat surgery for osteomyelitis at same site, prior cx grew MSSA, presume will be the same organism.  Patient described injured at age 10 to the area, but Kali's abscess in children is usually the sequale of hematogenous osteomyelitis rather than trauma. Patient note a great historian but did not complaint about chronic pain etc leading up to 2015 surgery.  Continue cefazolin for now  I will be away, returning to work at [employer] on 05/15/25. My colleagues with the ID service will be available for any issues in my absence and can be reached at [phone]. Thank you.   Rajendra Dejesus MD  Attending Physician  [facility]  Division of Infectious Diseases  [phone]
64F HTN, Lt distal tibia Kali's abscess s/p I+D, Bx, sequestrectomy, stimulan bead placement on 5/9 c/b post-op leukocytosis, anemia.    Kali's Abscess - continue Ancef IV; anticipate long course of IV abx treatment; PICC line placement for outpatient abx; pain control with oxyIR and ultram PRN  HTN - continue losartan

## 2025-05-12 NOTE — CONSULT NOTE ADULT - ASSESSMENT
[facility]  [id number]. Patient is a 64 year old female with PMH of recurrent L distal tibia Kali's abcess ( s/p I&D on 2/9/2015 with cultures with MSSA and discharged on Bactrim to be completed through 3/23/2015 ) who presents for scheduled left distal tibia biopsy, I&D of abscess, sequestrectomy, and insertion of stimulan beads (performed on 5/9). OR cultures pending. Purulence noted in op note. Patient states she has been having left lower leg pain for the past 3 weeks with increasing difficulty walking so she followed up with ortho.Patient denies fevers and chills. Denies any recent trauma but reports she has had on and off pain to the leg after an injury at the age of 10; states she was running and her leg fell onto some rocks. Never sought medical attention for her leg at the time and denies ever having any surgeries to her leg until 2015.      Outpatient MRI from 4/25/25 showing: High STIR signal structure within the distal tibia demonstrating peripheral enhancement and diffuse surrounding bone marrow edema and postcontrast enhancement as described. This structure is of uncertain etiology. Given the clinical history, a primary consideration is a Kali abscess. Outpatient CRP from 4/30 noted to 11 but no leukocytosis.      Abx:   Cefazolin 2 g IVPB q8h (5/9-)     #kali abscess s/p left distal tibia biopsy, I&D of abscess, sequestrectomy, and insertion of stimulan beads   -continue with Cefazolin 2 g IVPB q8h for now  -f/u OR cultures  -monitor WBC and fever curve     Case seen and discussed with Dr. Dejesus who agrees with assessment and plan. Note not final until attending addendum. 
64 year old female with PMH of HTN, recurrent L distal tibia Kali's abscess ( s/p I&D on 2/9/2015 with cultures with MSSA and discharged on Bactrim to be completed through 3/23/2015) presents for elective left distal tibia biopsy, I&D of abscess, sequestrectomy, and insertion of stimulan beads (performed on 5/9). OR cultures pending.

## 2025-05-12 NOTE — CONSULT NOTE ADULT - SUBJECTIVE AND OBJECTIVE BOX
[facility] Division of Hospital Medicine  Alejandro Rico MD  Contact via [employer] Teams (Mon-Fri 8AM-4PM)  Otherwise: contact Hospitalist in Charge at [phone]    Patient is a 64y old  Female who presents with a chief complaint of left tibial pain.    HPI:  Ms. Angelica Castrejon, 63 yo F, with previous medical history of HTN and recurrent left distal tibial Kali's abscess since 2015.  Prior to her presentation, the patient mentions having pain in her left distal leg. No trauma. No fevers. Has history of tibial abscess in the past.  Presents to the [facility] as an elective admission for left distal tibial biopsy, I&D, sequestrectomy, and stimulan bead insertion. Patient was taken to OR for left tibial procedure May 9, 2025 without any complication.  Patient has also been seen and evaluated by infectious disease team and is currently on Ancef IV.  No F/C, N/V, CP, SOB, Cough, lightheadedness, dizziness, abdominal pain, diarrhea, dysuria.  At present time, the patient states that her pain is 0/10.    In past 24h, patient has used Oxy IR 5mg dose once, and 10mg dose once.    Allergies  No Known Allergies    HOME MEDICATIONS: Reviewed    MEDICATIONS  (STANDING):  acetaminophen     Tablet .. 975 milliGRAM(s) Oral every 8 hours  aspirin  chewable 81 milliGRAM(s) Oral daily  atorvastatin 10 milliGRAM(s) Oral at bedtime  ceFAZolin   IVPB 2000 milliGRAM(s) IV Intermittent every 8 hours  losartan 12.5 milliGRAM(s) Oral daily  pantoprazole    Tablet 40 milliGRAM(s) Oral before breakfast  polyethylene glycol 3350 17 Gram(s) Oral at bedtime  senna 2 Tablet(s) Oral at bedtime    MEDICATIONS  (PRN):  magnesium hydroxide Suspension 30 milliLiter(s) Oral daily PRN Constipation  ondansetron Injectable 4 milliGRAM(s) IV Push every 6 hours PRN Nausea and/or Vomiting  oxyCODONE    IR 5 milliGRAM(s) Oral every 4 hours PRN Moderate Pain (4 - 6)  oxyCODONE    IR 10 milliGRAM(s) Oral every 4 hours PRN Severe Pain (7 - 10)  traMADol 50 milliGRAM(s) Oral every 6 hours PRN Mild Pain (1 - 3)      PAST MEDICAL & SURGICAL HISTORY:  Hypertension  Hyperlipidemia  Chronic osteomyelitis  Left ankle pain  H/O colonoscopy  History of incision and drainage      SOCIAL HISTORY:  No tobacco/alcohol use/abuse.    FAMILY HISTORY:  Family history of breast cancer        REVIEW OF SYSTEMS:    CONSTITUTIONAL: No fever, weight loss, or fatigue  EYES: No eye pain, visual disturbances, or discharge  ENMT:  No difficulty hearing, tinnitus, vertigo; No sinus or throat pain  NECK: No pain or stiffness  RESPIRATORY: No cough, wheezing, chills or hemoptysis; No shortness of breath  CARDIOVASCULAR: No chest pain, palpitations, dizziness, or leg swelling  GASTROINTESTINAL: No abdominal or epigastric pain. No nausea, vomiting, or hematemesis; No diarrhea or constipation. No melena or hematochezia.  GENITOURINARY: No dysuria, frequency, hematuria, or incontinence  NEUROLOGICAL: No headaches, memory loss, loss of strength, numbness, or tremors   LYMPH NODES: No enlarged glands  ENDOCRINE: No heat or cold intolerance; No hair loss  MUSCULOSKELETAL: See HPI  PSYCHIATRIC: No depression, anxiety, mood swings, or difficulty sleeping  HEME/LYMPH: No easy bruising, or bleeding gums  ALLERGY AND IMMUNOLOGIC: No hives or eczema    [] Unable to obtain due to poor mental status    Vital Signs Last 24 Hrs  T(C): 37.2 (12 May 2025 09:12), Max: 37.2 (12 May 2025 09:12)  T(F): 98.9 (12 May 2025 09:12), Max: 98.9 (12 May 2025 09:12)  HR: 71 (12 May 2025 09:12) (71 - 86)  BP: 114/61 (12 May 2025 09:12) (101/51 - 120/64)  BP(mean): --  RR: 17 (12 May 2025 09:12) (16 - 17)  SpO2: 97% (12 May 2025 09:12) (94% - 99%)    Parameters below as of 12 May 2025 09:12  Patient On (Oxygen Delivery Method): room air      CAPILLARY BLOOD GLUCOSE          PHYSICAL EXAM:    CONSTITUTIONAL: NAD, well-developed, well-groomed  EYES: PERRLA; conjunctiva and sclera clear  ENMT: Moist oral mucosa, no pharyngeal injection or exudates; normal dentition  NECK: Supple, no palpable masses; no thyromegaly  RESPIRATORY: Normal respiratory effort; lungs are clear to auscultation bilaterally  CARDIOVASCULAR: Regular rate and rhythm, normal S1 and S2, no murmur/rub/gallop; No lower extremity edema; Peripheral pulses are 2+ bilaterally  ABDOMEN: Nontender to palpation, normoactive bowel sounds, no rebound/guarding; No hepatosplenomegaly  MUSCULOSKELETAL:  Mild tenderness of surgical site left distal tibia wrapped in ACE wraps. Unable to assess gait; no clubbing or cyanosis of digits;  PSYCH: A+O to person, place, and time; affect appropriate  NEUROLOGY: CN 2-12 are intact and symmetric; no gross sensory deficits   SKIN: No rashes; no palpable lesions    LABS:                        11.1   5.35  )-----------( 363      ( 12 May 2025 05:38 )             34.0     05-12    140  |  102  |  16  ----------------------------<  99  3.6   |  28  |  0.63    Ca    9.0      12 May 2025 05:38        Urinalysis Basic - ( 12 May 2025 05:38 )    Color: x / Appearance: x / SG: x / pH: x  Gluc: 99 mg/dL / Ketone: x  / Bili: x / Urobili: x   Blood: x / Protein: x / Nitrite: x   Leuk Esterase: x / RBC: x / WBC x   Sq Epi: x / Non Sq Epi: x / Bacteria: x      CAPILLARY BLOOD GLUCOSE          RADIOLOGY & ADDITIONAL STUDIES:    MRI left ankle 4/23/2025  IMPRESSION:  High STIR signal structure within the distal tibia demonstrating peripheral enhancement and diffuse surrounding bone marrow edema and postcontrast enhancement as described. This structure is of uncertain etiology. Given the clinical history, a primary consideration is a Kali abscess.      Care Discussed with Consultant(s)/Other Providers:  Care Discussed with Primary Team.      [ ] Increased delirium risk  [x ] Delirium and other risks can be reduced by:          -early ambulation          -minimizing "tethers" - IV, oxygen, catheters, etc          -avoiding hypnotics and sedatives          -maintaining hydration/nutrition          -avoid anticholinergics - diphenhydramine, etc          -pain control          -supportive environment   [facility] Division of Hospital Medicine  Alejandro Rico MD  Contact via [employer] Teams (Mon-Fri 8AM-4PM)  Otherwise: contact Hospitalist in Charge at [phone]    Patient is a 64y old  Female who presents with a chief complaint of left tibial pain.    HPI:  Ms. Angelica Araujo, 63 yo F, with previous medical history of HTN and recurrent left distal tibial Kali's abscess since 2015.  Prior to her presentation, the patient mentions having pain in her left distal leg. No trauma. No fevers. Has history of tibial abscess in the past.  Presents to the [facility] as an elective admission for left distal tibial biopsy, I&D, sequestrectomy, and stimulan bead insertion. Patient was taken to OR for left tibial procedure May 9, 2025 without any complication.  Patient has also been seen and evaluated by infectious disease team and is currently on Ancef IV.  No F/C, N/V, CP, SOB, Cough, lightheadedness, dizziness, abdominal pain, diarrhea, dysuria.  At present time, the patient states that her pain is 0/10.    In past 24h, patient has used Oxy IR 5mg dose once, and 10mg dose once.    Allergies  No Known Allergies    HOME MEDICATIONS: Reviewed    MEDICATIONS  (STANDING):  acetaminophen     Tablet .. 975 milliGRAM(s) Oral every 8 hours  aspirin  chewable 81 milliGRAM(s) Oral daily  atorvastatin 10 milliGRAM(s) Oral at bedtime  ceFAZolin   IVPB 2000 milliGRAM(s) IV Intermittent every 8 hours  losartan 12.5 milliGRAM(s) Oral daily  pantoprazole    Tablet 40 milliGRAM(s) Oral before breakfast  polyethylene glycol 3350 17 Gram(s) Oral at bedtime  senna 2 Tablet(s) Oral at bedtime    MEDICATIONS  (PRN):  magnesium hydroxide Suspension 30 milliLiter(s) Oral daily PRN Constipation  ondansetron Injectable 4 milliGRAM(s) IV Push every 6 hours PRN Nausea and/or Vomiting  oxyCODONE    IR 5 milliGRAM(s) Oral every 4 hours PRN Moderate Pain (4 - 6)  oxyCODONE    IR 10 milliGRAM(s) Oral every 4 hours PRN Severe Pain (7 - 10)  traMADol 50 milliGRAM(s) Oral every 6 hours PRN Mild Pain (1 - 3)      PAST MEDICAL & SURGICAL HISTORY:  Hypertension  Hyperlipidemia  Chronic osteomyelitis  Left ankle pain  H/O colonoscopy  History of incision and drainage      SOCIAL HISTORY:  No tobacco/alcohol use/abuse.    FAMILY HISTORY:  Family history of breast cancer        REVIEW OF SYSTEMS:    CONSTITUTIONAL: No fever, weight loss, or fatigue  EYES: No eye pain, visual disturbances, or discharge  ENMT:  No difficulty hearing, tinnitus, vertigo; No sinus or throat pain  NECK: No pain or stiffness  RESPIRATORY: No cough, wheezing, chills or hemoptysis; No shortness of breath  CARDIOVASCULAR: No chest pain, palpitations, dizziness, or leg swelling  GASTROINTESTINAL: No abdominal or epigastric pain. No nausea, vomiting, or hematemesis; No diarrhea or constipation. No melena or hematochezia.  GENITOURINARY: No dysuria, frequency, hematuria, or incontinence  NEUROLOGICAL: No headaches, memory loss, loss of strength, numbness, or tremors   LYMPH NODES: No enlarged glands  ENDOCRINE: No heat or cold intolerance; No hair loss  MUSCULOSKELETAL: See HPI  PSYCHIATRIC: No depression, anxiety, mood swings, or difficulty sleeping  HEME/LYMPH: No easy bruising, or bleeding gums  ALLERGY AND IMMUNOLOGIC: No hives or eczema    [] Unable to obtain due to poor mental status    Vital Signs Last 24 Hrs  T(C): 37.2 (12 May 2025 09:12), Max: 37.2 (12 May 2025 09:12)  T(F): 98.9 (12 May 2025 09:12), Max: 98.9 (12 May 2025 09:12)  HR: 71 (12 May 2025 09:12) (71 - 86)  BP: 114/61 (12 May 2025 09:12) (101/51 - 120/64)  BP(mean): --  RR: 17 (12 May 2025 09:12) (16 - 17)  SpO2: 97% (12 May 2025 09:12) (94% - 99%)    Parameters below as of 12 May 2025 09:12  Patient On (Oxygen Delivery Method): room air      CAPILLARY BLOOD GLUCOSE          PHYSICAL EXAM:    CONSTITUTIONAL: NAD, well-developed, well-groomed  EYES: PERRLA; conjunctiva and sclera clear  ENMT: Moist oral mucosa, no pharyngeal injection or exudates; normal dentition  NECK: Supple, no palpable masses; no thyromegaly  RESPIRATORY: Normal respiratory effort; lungs are clear to auscultation bilaterally  CARDIOVASCULAR: Regular rate and rhythm, normal S1 and S2, no murmur/rub/gallop; No lower extremity edema; Peripheral pulses are 2+ bilaterally  ABDOMEN: Nontender to palpation, normoactive bowel sounds, no rebound/guarding; No hepatosplenomegaly  MUSCULOSKELETAL:  Mild tenderness of surgical site left distal tibia wrapped in ACE wraps. Unable to assess gait; no clubbing or cyanosis of digits;  PSYCH: A+O to person, place, and time; affect appropriate  NEUROLOGY: CN 2-12 are intact and symmetric; no gross sensory deficits   SKIN: No rashes; no palpable lesions    LABS:                        11.1   5.35  )-----------( 363      ( 12 May 2025 05:38 )             34.0     05-12    140  |  102  |  16  ----------------------------<  99  3.6   |  28  |  0.63    Ca    9.0      12 May 2025 05:38        Urinalysis Basic - ( 12 May 2025 05:38 )    Color: x / Appearance: x / SG: x / pH: x  Gluc: 99 mg/dL / Ketone: x  / Bili: x / Urobili: x   Blood: x / Protein: x / Nitrite: x   Leuk Esterase: x / RBC: x / WBC x   Sq Epi: x / Non Sq Epi: x / Bacteria: x      CAPILLARY BLOOD GLUCOSE          RADIOLOGY & ADDITIONAL STUDIES:    MRI left ankle 4/23/2025  IMPRESSION:  High STIR signal structure within the distal tibia demonstrating peripheral enhancement and diffuse surrounding bone marrow edema and postcontrast enhancement as described. This structure is of uncertain etiology. Given the clinical history, a primary consideration is a Kali abscess.      Care Discussed with Consultant(s)/Other Providers:  Care Discussed with Primary Team.      [ ] Increased delirium risk  [x ] Delirium and other risks can be reduced by:          -early ambulation          -minimizing "tethers" - IV, oxygen, catheters, etc          -avoiding hypnotics and sedatives          -maintaining hydration/nutrition          -avoid anticholinergics - diphenhydramine, etc          -pain control          -supportive environment

## 2025-05-12 NOTE — CONSULT NOTE ADULT - PROBLEM SELECTOR RECOMMENDATION 9
Left distal tibia affected  OR cultures pending  Gram culture gram + cocci clusters  Cefazolin 2 g IVPB q8h (5/9-)  ID following  Tramadol 50 Q6h for mild pain. Oxy IR 5-10 mg Q4h for mod-sev pain respectively  PT evaluation pending

## 2025-05-13 DIAGNOSIS — D50.0 IRON DEFICIENCY ANEMIA SECONDARY TO BLOOD LOSS (CHRONIC): ICD-10-CM

## 2025-05-13 DIAGNOSIS — D72.829 ELEVATED WHITE BLOOD CELL COUNT, UNSPECIFIED: ICD-10-CM

## 2025-05-13 LAB
ANION GAP SERPL CALC-SCNC: 9 MMOL/L — SIGNIFICANT CHANGE UP (ref 7–14)
BASOPHILS # BLD AUTO: 0.03 K/UL — SIGNIFICANT CHANGE UP (ref 0–0.2)
BASOPHILS NFR BLD AUTO: 0.6 % — SIGNIFICANT CHANGE UP (ref 0–2)
BUN SERPL-MCNC: 13 MG/DL — SIGNIFICANT CHANGE UP (ref 7–23)
CALCIUM SERPL-MCNC: 9.3 MG/DL — SIGNIFICANT CHANGE UP (ref 8.4–10.5)
CHLORIDE SERPL-SCNC: 105 MMOL/L — SIGNIFICANT CHANGE UP (ref 98–107)
CO2 SERPL-SCNC: 27 MMOL/L — SIGNIFICANT CHANGE UP (ref 22–31)
CREAT SERPL-MCNC: 0.62 MG/DL — SIGNIFICANT CHANGE UP (ref 0.5–1.3)
EGFR: 98 ML/MIN/1.73M2 — SIGNIFICANT CHANGE UP
EGFR: 98 ML/MIN/1.73M2 — SIGNIFICANT CHANGE UP
EOSINOPHIL # BLD AUTO: 0.21 K/UL — SIGNIFICANT CHANGE UP (ref 0–0.5)
EOSINOPHIL NFR BLD AUTO: 3.9 % — SIGNIFICANT CHANGE UP (ref 0–6)
GLUCOSE SERPL-MCNC: 92 MG/DL — SIGNIFICANT CHANGE UP (ref 70–99)
HCT VFR BLD CALC: 33.4 % — LOW (ref 34.5–45)
HGB BLD-MCNC: 11 G/DL — LOW (ref 11.5–15.5)
IANC: 3.2 K/UL — SIGNIFICANT CHANGE UP (ref 1.8–7.4)
IMM GRANULOCYTES NFR BLD AUTO: 0.4 % — SIGNIFICANT CHANGE UP (ref 0–0.9)
LYMPHOCYTES # BLD AUTO: 1.47 K/UL — SIGNIFICANT CHANGE UP (ref 1–3.3)
LYMPHOCYTES # BLD AUTO: 27.4 % — SIGNIFICANT CHANGE UP (ref 13–44)
MCHC RBC-ENTMCNC: 29.3 PG — SIGNIFICANT CHANGE UP (ref 27–34)
MCHC RBC-ENTMCNC: 32.9 G/DL — SIGNIFICANT CHANGE UP (ref 32–36)
MCV RBC AUTO: 89.1 FL — SIGNIFICANT CHANGE UP (ref 80–100)
MONOCYTES # BLD AUTO: 0.44 K/UL — SIGNIFICANT CHANGE UP (ref 0–0.9)
MONOCYTES NFR BLD AUTO: 8.2 % — SIGNIFICANT CHANGE UP (ref 2–14)
NEUTROPHILS # BLD AUTO: 3.2 K/UL — SIGNIFICANT CHANGE UP (ref 1.8–7.4)
NEUTROPHILS NFR BLD AUTO: 59.5 % — SIGNIFICANT CHANGE UP (ref 43–77)
NRBC # BLD AUTO: 0 K/UL — SIGNIFICANT CHANGE UP (ref 0–0)
NRBC # FLD: 0 K/UL — SIGNIFICANT CHANGE UP (ref 0–0)
NRBC BLD AUTO-RTO: 0 /100 WBCS — SIGNIFICANT CHANGE UP (ref 0–0)
PLATELET # BLD AUTO: 340 K/UL — SIGNIFICANT CHANGE UP (ref 150–400)
POTASSIUM SERPL-MCNC: 3.8 MMOL/L — SIGNIFICANT CHANGE UP (ref 3.5–5.3)
POTASSIUM SERPL-SCNC: 3.8 MMOL/L — SIGNIFICANT CHANGE UP (ref 3.5–5.3)
RBC # BLD: 3.75 M/UL — LOW (ref 3.8–5.2)
RBC # FLD: 12.6 % — SIGNIFICANT CHANGE UP (ref 10.3–14.5)
SODIUM SERPL-SCNC: 141 MMOL/L — SIGNIFICANT CHANGE UP (ref 135–145)
WBC # BLD: 5.37 K/UL — SIGNIFICANT CHANGE UP (ref 3.8–10.5)
WBC # FLD AUTO: 5.37 K/UL — SIGNIFICANT CHANGE UP (ref 3.8–10.5)

## 2025-05-13 PROCEDURE — 99232 SBSQ HOSP IP/OBS MODERATE 35: CPT

## 2025-05-13 PROCEDURE — G0545: CPT

## 2025-05-13 PROCEDURE — 99233 SBSQ HOSP IP/OBS HIGH 50: CPT

## 2025-05-13 RX ADMIN — OXYCODONE HYDROCHLORIDE 10 MILLIGRAM(S): 30 TABLET ORAL at 01:52

## 2025-05-13 RX ADMIN — Medication 975 MILLIGRAM(S): at 13:55

## 2025-05-13 RX ADMIN — OXYCODONE HYDROCHLORIDE 10 MILLIGRAM(S): 30 TABLET ORAL at 02:50

## 2025-05-13 RX ADMIN — POLYETHYLENE GLYCOL 3350 17 GRAM(S): 17 POWDER, FOR SOLUTION ORAL at 22:14

## 2025-05-13 RX ADMIN — LOSARTAN POTASSIUM 12.5 MILLIGRAM(S): 100 TABLET, FILM COATED ORAL at 05:31

## 2025-05-13 RX ADMIN — Medication 100 MILLIGRAM(S): at 05:31

## 2025-05-13 RX ADMIN — Medication 40 MILLIGRAM(S): at 05:32

## 2025-05-13 RX ADMIN — Medication 975 MILLIGRAM(S): at 22:13

## 2025-05-13 RX ADMIN — Medication 975 MILLIGRAM(S): at 23:13

## 2025-05-13 RX ADMIN — OXYCODONE HYDROCHLORIDE 10 MILLIGRAM(S): 30 TABLET ORAL at 14:16

## 2025-05-13 RX ADMIN — OXYCODONE HYDROCHLORIDE 10 MILLIGRAM(S): 30 TABLET ORAL at 13:46

## 2025-05-13 RX ADMIN — Medication 975 MILLIGRAM(S): at 05:32

## 2025-05-13 RX ADMIN — OXYCODONE HYDROCHLORIDE 10 MILLIGRAM(S): 30 TABLET ORAL at 23:13

## 2025-05-13 RX ADMIN — Medication 100 MILLIGRAM(S): at 13:42

## 2025-05-13 RX ADMIN — Medication 2 TABLET(S): at 22:13

## 2025-05-13 RX ADMIN — Medication 975 MILLIGRAM(S): at 13:42

## 2025-05-13 RX ADMIN — Medication 975 MILLIGRAM(S): at 06:30

## 2025-05-13 RX ADMIN — Medication 100 MILLIGRAM(S): at 22:14

## 2025-05-13 RX ADMIN — ATORVASTATIN CALCIUM 10 MILLIGRAM(S): 80 TABLET, FILM COATED ORAL at 22:13

## 2025-05-13 RX ADMIN — Medication 81 MILLIGRAM(S): at 13:42

## 2025-05-13 RX ADMIN — OXYCODONE HYDROCHLORIDE 10 MILLIGRAM(S): 30 TABLET ORAL at 22:13

## 2025-05-13 NOTE — PROGRESS NOTE ADULT - PROBLEM SELECTOR PLAN 3
Likely post-op reactive leukocytosis.  No clinical suspicion for infection.  Continue to monitor CBC. Likely post-op reactive leukocytosis contributed by underlying jacob's abscess. Continue to monitor CBC.

## 2025-05-14 LAB
ANION GAP SERPL CALC-SCNC: 9 MMOL/L — SIGNIFICANT CHANGE UP (ref 7–14)
BASOPHILS # BLD AUTO: 0.03 K/UL — SIGNIFICANT CHANGE UP (ref 0–0.2)
BASOPHILS NFR BLD AUTO: 0.5 % — SIGNIFICANT CHANGE UP (ref 0–2)
BUN SERPL-MCNC: 14 MG/DL — SIGNIFICANT CHANGE UP (ref 7–23)
CALCIUM SERPL-MCNC: 9.1 MG/DL — SIGNIFICANT CHANGE UP (ref 8.4–10.5)
CHLORIDE SERPL-SCNC: 104 MMOL/L — SIGNIFICANT CHANGE UP (ref 98–107)
CO2 SERPL-SCNC: 27 MMOL/L — SIGNIFICANT CHANGE UP (ref 22–31)
CREAT SERPL-MCNC: 0.59 MG/DL — SIGNIFICANT CHANGE UP (ref 0.5–1.3)
CULTURE RESULTS: NO GROWTH — SIGNIFICANT CHANGE UP
EGFR: 99 ML/MIN/1.73M2 — SIGNIFICANT CHANGE UP
EGFR: 99 ML/MIN/1.73M2 — SIGNIFICANT CHANGE UP
EOSINOPHIL # BLD AUTO: 0.28 K/UL — SIGNIFICANT CHANGE UP (ref 0–0.5)
EOSINOPHIL NFR BLD AUTO: 4.8 % — SIGNIFICANT CHANGE UP (ref 0–6)
GLUCOSE SERPL-MCNC: 88 MG/DL — SIGNIFICANT CHANGE UP (ref 70–99)
HCT VFR BLD CALC: 34 % — LOW (ref 34.5–45)
HGB BLD-MCNC: 11.1 G/DL — LOW (ref 11.5–15.5)
IANC: 3.66 K/UL — SIGNIFICANT CHANGE UP (ref 1.8–7.4)
IMM GRANULOCYTES NFR BLD AUTO: 0.2 % — SIGNIFICANT CHANGE UP (ref 0–0.9)
LYMPHOCYTES # BLD AUTO: 1.36 K/UL — SIGNIFICANT CHANGE UP (ref 1–3.3)
LYMPHOCYTES # BLD AUTO: 23.5 % — SIGNIFICANT CHANGE UP (ref 13–44)
MCHC RBC-ENTMCNC: 29.1 PG — SIGNIFICANT CHANGE UP (ref 27–34)
MCHC RBC-ENTMCNC: 32.6 G/DL — SIGNIFICANT CHANGE UP (ref 32–36)
MCV RBC AUTO: 89 FL — SIGNIFICANT CHANGE UP (ref 80–100)
MONOCYTES # BLD AUTO: 0.44 K/UL — SIGNIFICANT CHANGE UP (ref 0–0.9)
MONOCYTES NFR BLD AUTO: 7.6 % — SIGNIFICANT CHANGE UP (ref 2–14)
NEUTROPHILS # BLD AUTO: 3.66 K/UL — SIGNIFICANT CHANGE UP (ref 1.8–7.4)
NEUTROPHILS NFR BLD AUTO: 63.4 % — SIGNIFICANT CHANGE UP (ref 43–77)
NRBC # BLD AUTO: 0 K/UL — SIGNIFICANT CHANGE UP (ref 0–0)
NRBC # FLD: 0 K/UL — SIGNIFICANT CHANGE UP (ref 0–0)
NRBC BLD AUTO-RTO: 0 /100 WBCS — SIGNIFICANT CHANGE UP (ref 0–0)
PLATELET # BLD AUTO: 344 K/UL — SIGNIFICANT CHANGE UP (ref 150–400)
POTASSIUM SERPL-MCNC: 3.9 MMOL/L — SIGNIFICANT CHANGE UP (ref 3.5–5.3)
POTASSIUM SERPL-SCNC: 3.9 MMOL/L — SIGNIFICANT CHANGE UP (ref 3.5–5.3)
RBC # BLD: 3.82 M/UL — SIGNIFICANT CHANGE UP (ref 3.8–5.2)
RBC # FLD: 12.5 % — SIGNIFICANT CHANGE UP (ref 10.3–14.5)
SODIUM SERPL-SCNC: 140 MMOL/L — SIGNIFICANT CHANGE UP (ref 135–145)
SPECIMEN SOURCE: SIGNIFICANT CHANGE UP
WBC # BLD: 5.78 K/UL — SIGNIFICANT CHANGE UP (ref 3.8–10.5)
WBC # FLD AUTO: 5.78 K/UL — SIGNIFICANT CHANGE UP (ref 3.8–10.5)

## 2025-05-14 PROCEDURE — 99232 SBSQ HOSP IP/OBS MODERATE 35: CPT

## 2025-05-14 PROCEDURE — G0545: CPT

## 2025-05-14 RX ADMIN — Medication 975 MILLIGRAM(S): at 22:00

## 2025-05-14 RX ADMIN — OXYCODONE HYDROCHLORIDE 10 MILLIGRAM(S): 30 TABLET ORAL at 22:00

## 2025-05-14 RX ADMIN — Medication 975 MILLIGRAM(S): at 21:00

## 2025-05-14 RX ADMIN — Medication 100 MILLIGRAM(S): at 06:23

## 2025-05-14 RX ADMIN — OXYCODONE HYDROCHLORIDE 10 MILLIGRAM(S): 30 TABLET ORAL at 21:00

## 2025-05-14 RX ADMIN — Medication 975 MILLIGRAM(S): at 06:23

## 2025-05-14 RX ADMIN — Medication 975 MILLIGRAM(S): at 13:32

## 2025-05-14 RX ADMIN — Medication 100 MILLIGRAM(S): at 21:01

## 2025-05-14 RX ADMIN — POLYETHYLENE GLYCOL 3350 17 GRAM(S): 17 POWDER, FOR SOLUTION ORAL at 21:01

## 2025-05-14 RX ADMIN — OXYCODONE HYDROCHLORIDE 10 MILLIGRAM(S): 30 TABLET ORAL at 06:23

## 2025-05-14 RX ADMIN — LOSARTAN POTASSIUM 12.5 MILLIGRAM(S): 100 TABLET, FILM COATED ORAL at 06:22

## 2025-05-14 RX ADMIN — Medication 975 MILLIGRAM(S): at 14:32

## 2025-05-14 RX ADMIN — Medication 81 MILLIGRAM(S): at 17:32

## 2025-05-14 RX ADMIN — OXYCODONE HYDROCHLORIDE 10 MILLIGRAM(S): 30 TABLET ORAL at 07:21

## 2025-05-14 RX ADMIN — Medication 975 MILLIGRAM(S): at 07:21

## 2025-05-14 RX ADMIN — Medication 2 TABLET(S): at 21:00

## 2025-05-14 RX ADMIN — Medication 100 MILLIGRAM(S): at 13:34

## 2025-05-14 RX ADMIN — ATORVASTATIN CALCIUM 10 MILLIGRAM(S): 80 TABLET, FILM COATED ORAL at 21:00

## 2025-05-14 RX ADMIN — Medication 40 MILLIGRAM(S): at 06:23

## 2025-05-14 NOTE — PROGRESS NOTE ADULT - PROBLEM SELECTOR PLAN 3
Likely post-op reactive leukocytosis contributed by underlying jacob's abscess. Continue to monitor CBC.

## 2025-05-14 NOTE — PROGRESS NOTE ADULT - PROBLEM SELECTOR PLAN 1
- s/p I+D, Bx, sequestrectomy, stimulan bead placement on 5/9  - Pain control with Ultram PRN, OxyIR PRN  - Bowel regiment - Senna/Miralax  - Surgical site management as per ortho  - PT/OT eval for safe dispo  - VTE ppx - ASA
- s/p I+D, Bx, sequestrectomy, stimulan bead placement on 5/9  - Pain control with Ultram PRN, OxyIR PRN  - Bowel regiment - Senna/Miralax  - Surgical site management as per ortho  - PT/OT eval for safe dispo  - VTE ppx - ASA  - continue Ancef IV for long term abx  - plan for PICC Placement.

## 2025-05-14 NOTE — CHART NOTE - NSCHARTNOTEFT_GEN_A_CORE
IR pre- procedure    Patient Age: 64    Patient Gender: F    Procedure (including site / side if known): PICC    Diagnosis / Indication: osteomyelitis L tibia    Interventional Radiology Attending Physician:     Ordering Attending Physician: Johnie COLLINS    Pertinent Medical History:    PAST MEDICAL & SURGICAL HISTORY:  Hypertension      Hyperlipidemia      Chronic osteomyelitis      Left ankle pain      H/O colonoscopy      History of incision and drainage            Pertinent Labs:                            11.1   5.78  )-----------( 344      ( 14 May 2025 05:30 )             34.0       05-14    140  |  104  |  14  ----------------------------<  88  3.9   |  27  |  0.59    Ca    9.1      14 May 2025 05:30            Patient and Family aware:   [ X ]Y   [  ]N

## 2025-05-15 ENCOUNTER — TRANSCRIPTION ENCOUNTER (OUTPATIENT)
Age: 65
End: 2025-05-15

## 2025-05-15 VITALS
HEART RATE: 83 BPM | OXYGEN SATURATION: 97 % | RESPIRATION RATE: 17 BRPM | DIASTOLIC BLOOD PRESSURE: 58 MMHG | TEMPERATURE: 98 F | SYSTOLIC BLOOD PRESSURE: 112 MMHG

## 2025-05-15 PROCEDURE — 36573 INSJ PICC RS&I 5 YR+: CPT

## 2025-05-15 RX ORDER — SENNA 187 MG
2 TABLET ORAL
Qty: 28 | Refills: 0
Start: 2025-05-15 | End: 2025-05-28

## 2025-05-15 RX ORDER — MELOXICAM 15 MG/1
1 TABLET ORAL
Refills: 0 | DISCHARGE

## 2025-05-15 RX ORDER — ALENDRONATE SODIUM 70 MG/1
1 TABLET ORAL
Refills: 0 | DISCHARGE

## 2025-05-15 RX ORDER — CEFAZOLIN SODIUM IN 0.9 % NACL 3 G/100 ML
0 INTRAVENOUS SOLUTION, PIGGYBACK (ML) INTRAVENOUS
Refills: 0
Start: 2025-05-15

## 2025-05-15 RX ORDER — POLYETHYLENE GLYCOL 3350 17 G/17G
17 POWDER, FOR SOLUTION ORAL
Qty: 238 | Refills: 0
Start: 2025-05-15 | End: 2025-05-28

## 2025-05-15 RX ORDER — CEFAZOLIN SODIUM IN 0.9 % NACL 3 G/100 ML
2 INTRAVENOUS SOLUTION, PIGGYBACK (ML) INTRAVENOUS
Refills: 0
Start: 2025-05-15

## 2025-05-15 RX ORDER — OXYCODONE HYDROCHLORIDE 30 MG/1
1 TABLET ORAL
Qty: 28 | Refills: 0
Start: 2025-05-15 | End: 2025-05-21

## 2025-05-15 RX ORDER — ASPIRIN 325 MG
1 TABLET ORAL
Qty: 42 | Refills: 0
Start: 2025-05-15 | End: 2025-06-25

## 2025-05-15 RX ORDER — CEFAZOLIN SODIUM IN 0.9 % NACL 3 G/100 ML
20 INTRAVENOUS SOLUTION, PIGGYBACK (ML) INTRAVENOUS
Qty: 2100 | Refills: 0
Start: 2025-05-15 | End: 2025-06-18

## 2025-05-15 RX ORDER — ACETAMINOPHEN 500 MG/5ML
3 LIQUID (ML) ORAL
Qty: 0 | Refills: 0 | DISCHARGE
Start: 2025-05-15

## 2025-05-15 RX ADMIN — Medication 40 MILLIGRAM(S): at 05:40

## 2025-05-15 RX ADMIN — OXYCODONE HYDROCHLORIDE 10 MILLIGRAM(S): 30 TABLET ORAL at 04:33

## 2025-05-15 RX ADMIN — Medication 100 MILLIGRAM(S): at 05:40

## 2025-05-15 RX ADMIN — Medication 975 MILLIGRAM(S): at 05:40

## 2025-05-15 RX ADMIN — Medication 100 MILLIGRAM(S): at 14:02

## 2025-05-15 RX ADMIN — Medication 975 MILLIGRAM(S): at 06:40

## 2025-05-15 RX ADMIN — Medication 975 MILLIGRAM(S): at 15:02

## 2025-05-15 RX ADMIN — LOSARTAN POTASSIUM 12.5 MILLIGRAM(S): 100 TABLET, FILM COATED ORAL at 05:40

## 2025-05-15 RX ADMIN — Medication 81 MILLIGRAM(S): at 11:54

## 2025-05-15 RX ADMIN — OXYCODONE HYDROCHLORIDE 10 MILLIGRAM(S): 30 TABLET ORAL at 05:33

## 2025-05-15 RX ADMIN — Medication 975 MILLIGRAM(S): at 14:02

## 2025-05-15 NOTE — DISCHARGE NOTE PROVIDER - NSDCFUSCHEDAPPT_GEN_ALL_CORE_FT
[facility]  ORTHOSURG [id number] [facility]  Scheduled Appointment: 05/23/2025

## 2025-05-15 NOTE — DISCHARGE NOTE PROVIDER - NSDCMRMEDTOKEN_GEN_ALL_CORE_FT
acetaminophen 325 mg oral tablet: 3 tab(s) orally every 8 hours  aspirin 81 mg oral tablet, chewable: 1 tab(s) orally once a day  Benadryl 25 mg oral capsule: 1 cap(s) orally once a day as needed for  allergy symptoms  ceFAZolin 2 g/20 mL intravenous solution: 20 milliliter(s) intravenous every 8 hours  IV PICC Flushes: pre and post IV abx administration  losartan 25 mg oral tablet: 0.5 tab(s) orally once a day  oxyCODONE 5 mg oral tablet: 1 tab(s) orally every 6 hours as needed for  severe pain MDD: 4  pantoprazole 40 mg oral delayed release tablet: 1 tab(s) orally once a day (before a meal)  polyethylene glycol 3350 oral powder for reconstitution: 17 gram(s) orally once a day (at bedtime)  senna leaf extract oral tablet: 2 tab(s) orally once a day (at bedtime)  simvastatin 20 mg oral tablet: 1 tab(s) orally once a day  Vitamin D 50,000 IU orally once a week:   Weekly labs: CBC and CMP,  send results [email or web address]

## 2025-05-15 NOTE — DISCHARGE NOTE PROVIDER - NSDCCPTREATMENT_GEN_ALL_CORE_FT
PRINCIPAL PROCEDURE  Procedure: Irrigation and debridement of left tibia  Findings and Treatment: Biopsy, sequestrectomy, stimulan bead placement

## 2025-05-15 NOTE — DISCHARGE NOTE PROVIDER - NSDCCPCAREPLAN_GEN_ALL_CORE_FT
PRINCIPAL DISCHARGE DIAGNOSIS  Diagnosis: Kali's abscess, tibia  Assessment and Plan of Treatment:

## 2025-05-15 NOTE — PROGRESS NOTE ADULT - SUBJECTIVE AND OBJECTIVE BOX
Infectious Diseases Follow Up:    Patient is a 64y old  Female who presents with a chief complaint of Elective admission for left distal tibial procedure (12 May 2025 10:39)      Interval History/ROS:  [place] : [id number]  Feeling well, denies F/C, ambulating w/o issue      Allergies  No Known Allergies        ANTIMICROBIALS:  ceFAZolin   IVPB 2000 every 8 hours      Current Abx:     Previous Abx     OTHER MEDS:  MEDICATIONS  (STANDING):  acetaminophen     Tablet .. 975 every 8 hours  aspirin  chewable 81 daily  atorvastatin 10 at bedtime  losartan 12.5 daily  magnesium hydroxide Suspension 30 daily PRN  ondansetron Injectable 4 every 6 hours PRN  oxyCODONE    IR 5 every 4 hours PRN  oxyCODONE    IR 10 every 4 hours PRN  pantoprazole    Tablet 40 before breakfast  polyethylene glycol 3350 17 at bedtime  senna 2 at bedtime  traMADol 50 every 6 hours PRN      Vital Signs Last 24 Hrs  T(C): 36.7 (14 May 2025 13:44), Max: 37.3 (14 May 2025 06:23)  T(F): 98 (14 May 2025 13:44), Max: 99.1 (14 May 2025 06:23)  HR: 83 (14 May 2025 13:44) (64 - 83)  BP: 121/63 (14 May 2025 13:44) (105/57 - 121/63)  BP(mean): --  RR: 18 (14 May 2025 13:44) (18 - 18)  SpO2: 97% (14 May 2025 13:44) (97% - 99%)    Parameters below as of 14 May 2025 13:44  Patient On (Oxygen Delivery Method): room air        PHYSICAL EXAM:  GENERAL: NAD, well-developed  HEAD:  Atraumatic, Normocephalic  EYES: EOMI, conjunctiva and sclera clear  CHEST/LUNG: On RA, not in respiratory distress, clear to auscultation bilaterally;   HEART: Regular rate and rhythm;   ABDOMEN: Soft, Nontender, Nondistended;   EXTREMITIES:  LLE wrapped   PSYCH: AAOx3                                     11.1   5.78  )-----------( 344      ( 14 May 2025 05:30 )             34.0       05-14    140  |  104  |  14  ----------------------------<  88  3.9   |  27  |  0.59    Ca    9.1      14 May 2025 05:30        Urinalysis Basic - ( 14 May 2025 05:30 )    Color: x / Appearance: x / SG: x / pH: x  Gluc: 88 mg/dL / Ketone: x  / Bili: x / Urobili: x   Blood: x / Protein: x / Nitrite: x   Leuk Esterase: x / RBC: x / WBC x   Sq Epi: x / Non Sq Epi: x / Bacteria: x        MICROBIOLOGY:  v  Blood Blood-Peripheral  05-13-25   No growth at 24 hours  --  --      Blood Blood-Peripheral  05-12-25   No growth at 24 hours  --  --      Tissue  05-09-25   No growth  --    Rare polymorphonuclear leukocytes per low power field  No organisms seen per oil power field      Tissue  05-09-25   No growth to date  Culture in progress  --    Few polymorphonuclear leukocytes per low power field  Rare Gram Positive Cocci in Clusters per oil power field      Abscess  05-09-25   No growth  --    Few polymorphonuclear leukocytes per low power field  Few Gram Positive Cocci in Clusters per oil power field      Abscess  05-09-25   No growth to date  Culture in progress  --    Few polymorphonuclear leukocytes per low power field  Rare Gram Positive Cocci in Clusters per oil power field                RADIOLOGY:    
ORTHO PROGRESS NOTE     Pt seen and examined at bedside, denies SOB, CP, Dizziness. N/V/D /HA.  No significant overnight events. Pain well controlled.    Vital Signs Last 24 Hrs  T(C): 36.6 (13 May 2025 05:43), Max: 37.2 (12 May 2025 09:12)  T(F): 97.9 (13 May 2025 05:43), Max: 98.9 (12 May 2025 09:12)  HR: 77 (13 May 2025 05:43) (71 - 87)  BP: 116/63 (13 May 2025 05:43) (110/61 - 123/55)  BP(mean): --  RR: 17 (13 May 2025 05:43) (17 - 18)  SpO2: 98% (13 May 2025 05:43) (95% - 98%)    Parameters below as of 13 May 2025 05:43  Patient On (Oxygen Delivery Method): room air        Gen: NAD, alert and oriented  Resp: Unlabored breathing  LLE: Dressing c/d/i       SILT DP/SP/ Toby/Saph/tib       5/5 EHL 5/5 FHL 5/5 TA 5/5 Gastroc 5/5 IP        DP+,        soft compartments, no calf ttp,       Labs:  CBC Full  -  ( 12 May 2025 05:38 )  WBC Count : 5.35 K/uL  RBC Count : 3.82 M/uL  Hemoglobin : 11.1 g/dL  Hematocrit : 34.0 %  Platelet Count - Automated : 363 K/uL  Mean Cell Volume : 89.0 fL  Mean Cell Hemoglobin : 29.1 pg  Mean Cell Hemoglobin Concentration : 32.6 g/dL  Auto Neutrophil # : x  Auto Lymphocyte # : x  Auto Monocyte # : x  Auto Eosinophil # : x  Auto Basophil # : x  Auto Neutrophil % : x  Auto Lymphocyte % : x  Auto Monocyte % : x  Auto Eosinophil % : x  Auto Basophil % : x      05-12    140  |  102  |  16  ----------------------------<  99  3.6   |  28  |  0.63    Ca    9.0      12 May 2025 05:38        64F s/p L distal tibia biopsy, sequestrectomy, I&D    WBAT LLE   FU OR cultures, gram stain GPCs  FU OR path  PT/OT  FU ID recs, on standing ancef  
ORTHOPAEDICS DAILY PROGRESS NOTE:       SUBJECTIVE/ROS: POD 5. Seen and examined. Pain well controlled. Has been working with Pt. Denies CP/SOB/N/V. OR Cx ngtd, remains on ancef         MEDICATIONS  (STANDING):  acetaminophen     Tablet .. 975 milliGRAM(s) Oral every 8 hours  aspirin  chewable 81 milliGRAM(s) Oral daily  atorvastatin 10 milliGRAM(s) Oral at bedtime  ceFAZolin   IVPB 2000 milliGRAM(s) IV Intermittent every 8 hours  losartan 12.5 milliGRAM(s) Oral daily  pantoprazole    Tablet 40 milliGRAM(s) Oral before breakfast  polyethylene glycol 3350 17 Gram(s) Oral at bedtime  senna 2 Tablet(s) Oral at bedtime    MEDICATIONS  (PRN):  magnesium hydroxide Suspension 30 milliLiter(s) Oral daily PRN Constipation  ondansetron Injectable 4 milliGRAM(s) IV Push every 6 hours PRN Nausea and/or Vomiting  oxyCODONE    IR 5 milliGRAM(s) Oral every 4 hours PRN Moderate Pain (4 - 6)  oxyCODONE    IR 10 milliGRAM(s) Oral every 4 hours PRN Severe Pain (7 - 10)  traMADol 50 milliGRAM(s) Oral every 6 hours PRN Mild Pain (1 - 3)      OBJECTIVE:    Vital Signs Last 24 Hrs  T(C): 36.7 (14 May 2025 01:16), Max: 36.8 (13 May 2025 17:26)  T(F): 98.1 (14 May 2025 01:16), Max: 98.3 (13 May 2025 21:25)  HR: 64 (14 May 2025 01:16) (64 - 75)  BP: 120/52 (14 May 2025 01:16) (105/57 - 120/52)  BP(mean): --  RR: 18 (14 May 2025 01:16) (17 - 18)  SpO2: 97% (14 May 2025 01:16) (95% - 99%)    Parameters below as of 14 May 2025 01:16  Patient On (Oxygen Delivery Method): room air        I&O's Detail    12 May 2025 07:01  -  13 May 2025 07:00  --------------------------------------------------------  IN:  Total IN: 0 mL    OUT:    Voided (mL): 550 mL  Total OUT: 550 mL    Total NET: -550 mL          Daily     Daily     LABS:                        11.0   5.37  )-----------( 340      ( 13 May 2025 05:54 )             33.4     05-13    141  |  105  |  13  ----------------------------<  92  3.8   |  27  |  0.62    Ca    9.3      13 May 2025 05:54        Urinalysis Basic - ( 13 May 2025 05:54 )    Color: x / Appearance: x / SG: x / pH: x  Gluc: 92 mg/dL / Ketone: x  / Bili: x / Urobili: x   Blood: x / Protein: x / Nitrite: x   Leuk Esterase: x / RBC: x / WBC x   Sq Epi: x / Non Sq Epi: x / Bacteria: x                PHYSICAL EXAM:  nad  nonlabored resp  lle  dressing c/d/i  +gastroc/ta/ehl/fhl  silt s/s/sp/dp/t  +dp  
  Infectious Diseases Follow Up:    Patient is a 64y old  Female who presents with a chief complaint of Elective admission for left distal tibial procedure (12 May 2025 10:39)      Interval History/ROS:  No acute events, feeling well. Mild back pain.  ID [id number]      Allergies  No Known Allergies        ANTIMICROBIALS:  ceFAZolin   IVPB 2000 every 8 hours      Current Abx:     Previous Abx     OTHER MEDS:  MEDICATIONS  (STANDING):  acetaminophen     Tablet .. 975 every 8 hours  aspirin  chewable 81 daily  atorvastatin 10 at bedtime  losartan 12.5 daily  magnesium hydroxide Suspension 30 daily PRN  ondansetron Injectable 4 every 6 hours PRN  oxyCODONE    IR 5 every 4 hours PRN  oxyCODONE    IR 10 every 4 hours PRN  pantoprazole    Tablet 40 before breakfast  polyethylene glycol 3350 17 at bedtime  senna 2 at bedtime  traMADol 50 every 6 hours PRN      Vital Signs Last 24 Hrs  T(C): 36.7 (13 May 2025 09:27), Max: 36.9 (12 May 2025 13:10)  T(F): 98.1 (13 May 2025 09:27), Max: 98.5 (12 May 2025 13:10)  HR: 74 (13 May 2025 09:27) (74 - 87)  BP: 112/58 (13 May 2025 09:27) (110/61 - 123/55)  BP(mean): --  RR: 18 (13 May 2025 09:27) (17 - 18)  SpO2: 95% (13 May 2025 09:27) (95% - 98%)    Parameters below as of 13 May 2025 09:27  Patient On (Oxygen Delivery Method): room air        PHYSICAL EXAM:  GENERAL: NAD, well-developed  HEAD:  Atraumatic, Normocephalic  EYES: EOMI, conjunctiva and sclera clear  CHEST/LUNG: On RA, not in respiratory distress, clear to auscultation bilaterally;   HEART: Regular rate and rhythm;   ABDOMEN: Soft, Nontender, Nondistended;   EXTREMITIES:  LLE wrapped   PSYCH: AAOx3                          11.0   5.37  )-----------( 340      ( 13 May 2025 05:54 )             33.4       05-13    141  |  105  |  13  ----------------------------<  92  3.8   |  27  |  0.62    Ca    9.3      13 May 2025 05:54        Urinalysis Basic - ( 13 May 2025 05:54 )    Color: x / Appearance: x / SG: x / pH: x  Gluc: 92 mg/dL / Ketone: x  / Bili: x / Urobili: x   Blood: x / Protein: x / Nitrite: x   Leuk Esterase: x / RBC: x / WBC x   Sq Epi: x / Non Sq Epi: x / Bacteria: x        MICROBIOLOGY:  v  Tissue  05-09-25   No growth  --    Rare polymorphonuclear leukocytes per low power field  No organisms seen per oil power field      Tissue  05-09-25   No growth  --    Few polymorphonuclear leukocytes per low power field  Rare Gram Positive Cocci in Clusters per oil power field      Abscess  05-09-25   No growth  --    Few polymorphonuclear leukocytes per low power field  Few Gram Positive Cocci in Clusters per oil power field      Abscess  05-09-25   No growth  --    Few polymorphonuclear leukocytes per low power field  Rare Gram Positive Cocci in Clusters per oil power field                RADIOLOGY:    
  ORTHO POC      Patient resting comfortably without complaint s/p left distal tibia biopsy, sequestrectomy, I and D, placement of stimulan beads today     Vital Signs Last 24 Hrs  T(C): 36.5 (09 May 2025 13:30), Max: 36.5 (09 May 2025 13:30)  T(F): 97.7 (09 May 2025 13:30), Max: 97.7 (09 May 2025 13:30)  HR: 87 (09 May 2025 14:00) (87 - 105)  BP: 122/67 (09 May 2025 14:00) (117/59 - 135/74)  BP(mean): 84 (09 May 2025 14:00) (76 - 88)  RR: 20 (09 May 2025 14:00) (16 - 23)  SpO2: 95% (09 May 2025 14:00) (94% - 98%)    Parameters below as of 09 May 2025 14:00  Patient On (Oxygen Delivery Method): room air        left lower extremity  : dressing clean/dry/ intact             LE:  Motor / sensory decreased 2/2 block                  DP pulse 2+    Labs : AM                 U/O:  DTV    A/P: Stable postop            PT- WBAT            DVT prophylaxis- venodynes/ ASA QD           Pain Control            Incentive Spirometer            Ancef           Follow up AM labs/ OR Cx /ID 
ORTHOPAEDICS DAILY PROGRESS NOTE:       SUBJECTIVE/ROS: POD 3. Seen and examined. Pain well controlled. Has been working with Pt. Denies CP/SOB/N/V.         MEDICATIONS  (STANDING):  acetaminophen     Tablet .. 975 milliGRAM(s) Oral every 8 hours  aspirin  chewable 81 milliGRAM(s) Oral daily  atorvastatin 10 milliGRAM(s) Oral at bedtime  ceFAZolin   IVPB 2000 milliGRAM(s) IV Intermittent every 8 hours  losartan 12.5 milliGRAM(s) Oral daily  pantoprazole    Tablet 40 milliGRAM(s) Oral before breakfast  polyethylene glycol 3350 17 Gram(s) Oral at bedtime  senna 2 Tablet(s) Oral at bedtime    MEDICATIONS  (PRN):  magnesium hydroxide Suspension 30 milliLiter(s) Oral daily PRN Constipation  ondansetron Injectable 4 milliGRAM(s) IV Push every 6 hours PRN Nausea and/or Vomiting  oxyCODONE    IR 5 milliGRAM(s) Oral every 4 hours PRN Moderate Pain (4 - 6)  oxyCODONE    IR 10 milliGRAM(s) Oral every 4 hours PRN Severe Pain (7 - 10)  traMADol 50 milliGRAM(s) Oral every 6 hours PRN Mild Pain (1 - 3)      OBJECTIVE:    Vital Signs Last 24 Hrs  T(C): 36.9 (12 May 2025 05:00), Max: 36.9 (12 May 2025 05:00)  T(F): 98.4 (12 May 2025 05:00), Max: 98.4 (12 May 2025 05:00)  HR: 78 (12 May 2025 05:00) (75 - 86)  BP: 120/64 (12 May 2025 05:00) (101/51 - 120/64)  BP(mean): --  RR: 16 (12 May 2025 05:00) (16 - 18)  SpO2: 97% (12 May 2025 05:00) (94% - 99%)    Parameters below as of 12 May 2025 05:00  Patient On (Oxygen Delivery Method): room air        I&O's Detail      Daily     Daily     LABS:                        11.1   7.86  )-----------( 389      ( 11 May 2025 05:16 )             34.0     05-11    141  |  102  |  22  ----------------------------<  88  3.3[L]   |  26  |  0.66    Ca    9.1      11 May 2025 05:16        Urinalysis Basic - ( 11 May 2025 05:16 )    Color: x / Appearance: x / SG: x / pH: x  Gluc: 88 mg/dL / Ketone: x  / Bili: x / Urobili: x   Blood: x / Protein: x / Nitrite: x   Leuk Esterase: x / RBC: x / WBC x   Sq Epi: x / Non Sq Epi: x / Bacteria: x                PHYSICAL EXAM:  nad  nonlabored resp  lle  dressing c/d/i  +gastroc/ta/ehl/fhl  silt s/s/sp/dp/t  +dp  
ORTHOPAEDICS DAILY PROGRESS NOTE:       SUBJECTIVE/ROS: POD 6. Seen and examined. Pain well controlled. Has been working with Pt. Denies CP/SOB/N/V. OR Cx NGTD, on standing ancef, pending PICC         MEDICATIONS  (STANDING):  acetaminophen     Tablet .. 975 milliGRAM(s) Oral every 8 hours  aspirin  chewable 81 milliGRAM(s) Oral daily  atorvastatin 10 milliGRAM(s) Oral at bedtime  ceFAZolin   IVPB 2000 milliGRAM(s) IV Intermittent every 8 hours  losartan 12.5 milliGRAM(s) Oral daily  pantoprazole    Tablet 40 milliGRAM(s) Oral before breakfast  polyethylene glycol 3350 17 Gram(s) Oral at bedtime  senna 2 Tablet(s) Oral at bedtime    MEDICATIONS  (PRN):  magnesium hydroxide Suspension 30 milliLiter(s) Oral daily PRN Constipation  ondansetron Injectable 4 milliGRAM(s) IV Push every 6 hours PRN Nausea and/or Vomiting  oxyCODONE    IR 5 milliGRAM(s) Oral every 4 hours PRN Moderate Pain (4 - 6)  oxyCODONE    IR 10 milliGRAM(s) Oral every 4 hours PRN Severe Pain (7 - 10)  traMADol 50 milliGRAM(s) Oral every 6 hours PRN Mild Pain (1 - 3)      OBJECTIVE:    Vital Signs Last 24 Hrs  T(C): 36.8 (15 May 2025 05:10), Max: 36.8 (14 May 2025 17:52)  T(F): 98.2 (15 May 2025 05:10), Max: 98.2 (14 May 2025 17:52)  HR: 91 (15 May 2025 05:10) (73 - 91)  BP: 122/70 (15 May 2025 05:10) (106/60 - 122/70)  BP(mean): --  RR: 18 (15 May 2025 05:10) (17 - 18)  SpO2: 96% (15 May 2025 05:10) (95% - 98%)    Parameters below as of 15 May 2025 05:10  Patient On (Oxygen Delivery Method): room air        I&O's Detail      Daily     Daily     LABS:                        11.1   5.78  )-----------( 344      ( 14 May 2025 05:30 )             34.0     05-14    140  |  104  |  14  ----------------------------<  88  3.9   |  27  |  0.59    Ca    9.1      14 May 2025 05:30        Urinalysis Basic - ( 14 May 2025 05:30 )    Color: x / Appearance: x / SG: x / pH: x  Gluc: 88 mg/dL / Ketone: x  / Bili: x / Urobili: x   Blood: x / Protein: x / Nitrite: x   Leuk Esterase: x / RBC: x / WBC x   Sq Epi: x / Non Sq Epi: x / Bacteria: x                PHYSICAL EXAM:  nad  nonlabored resp  lle  dressing c/d/i  +gastroc/ta/ehl/fhl  silt s/s/sp/dp/t  +dp    
Orthopedic Surgery Progress Note     S: Patient seen and examined today. No acute events overnight. Pain is well controlled. Denies f/c, chest pain, shortness of breath, dizziness.    MEDICATIONS  (STANDING):  aspirin  chewable 81 milliGRAM(s) Oral daily  atorvastatin 10 milliGRAM(s) Oral at bedtime  ceFAZolin   IVPB 2000 milliGRAM(s) IV Intermittent every 8 hours  losartan 12.5 milliGRAM(s) Oral daily  pantoprazole    Tablet 40 milliGRAM(s) Oral before breakfast  polyethylene glycol 3350 17 Gram(s) Oral at bedtime  senna 2 Tablet(s) Oral at bedtime    MEDICATIONS  (PRN):  acetaminophen     Tablet .. 650 milliGRAM(s) Oral every 6 hours PRN Temp greater or equal to 38.5C (101.3F), Mild Pain (1 - 3)  magnesium hydroxide Suspension 30 milliLiter(s) Oral daily PRN Constipation  ondansetron Injectable 4 milliGRAM(s) IV Push every 6 hours PRN Nausea and/or Vomiting  oxyCODONE    IR 2.5 milliGRAM(s) Oral every 4 hours PRN Moderate Pain (4 - 6)  oxyCODONE    IR 5 milliGRAM(s) Oral every 4 hours PRN Severe Pain (7 - 10)  traMADol 50 milliGRAM(s) Oral every 6 hours PRN Mild Pain (1 - 3)      Physical Exam:  Gen: NAD  LLE:  ankle in ace wrap dry and intact  unable to assess motor/sensory function 2/2 block    Vital Signs Last 24 Hrs  T(C): 37 (10 May 2025 05:10), Max: 37 (10 May 2025 05:10)  T(F): 98.6 (10 May 2025 05:10), Max: 98.6 (10 May 2025 05:10)  HR: 100 (10 May 2025 05:10) (77 - 105)  BP: 109/61 (10 May 2025 05:10) (97/71 - 135/74)  BP(mean): 71 (10 May 2025 05:10) (71 - 88)  RR: 16 (10 May 2025 05:10) (16 - 23)  SpO2: 96% (10 May 2025 05:10) (94% - 100%)    Parameters below as of 10 May 2025 05:10  Patient On (Oxygen Delivery Method): room air        05-09-25 @ 07:01  -  05-10-25 @ 07:00  --------------------------------------------------------  IN: 550 mL / OUT: 800 mL / NET: -250 mL                    LABS:                        11.8   11.11 )-----------( 444      ( 10 May 2025 05:11 )             36.1     05-10    138  |  101  |  20  ----------------------------<  109[H]  3.6   |  23  |  0.64    Ca    9.3      10 May 2025 05:11    
SUBJECTIVE  Patient seen and examined at bedside. No acute events overnight. States pain is controlled. Denies fevers/chills, chest pain, or dyspnea.    OBJECTIVE  Vital Signs Last 24 Hrs  T(C): 36.6 (11 May 2025 05:45), Max: 37.2 (10 May 2025 17:16)  T(F): 97.9 (11 May 2025 05:45), Max: 99 (10 May 2025 17:16)  HR: 83 (11 May 2025 05:45) (78 - 88)  BP: 108/52 (11 May 2025 05:45) (108/52 - 119/59)  BP(mean): --  RR: 16 (11 May 2025 05:45) (16 - 18)  SpO2: 98% (11 May 2025 05:45) (95% - 98%)    Parameters below as of 11 May 2025 05:45  Patient On (Oxygen Delivery Method): room air        PHYSICAL EXAM  Gen: Lying in bed, NAD  Resp: Even, nonlabored breathing   LLE:  Ace dressing c/d/i, compartments soft, no calf TTP b/l  Motor: TA/EHL/GS/FHL intact  Sensory: DP/SP/Tib/Toby/Saph SILT  +DP pulse, WWP      LABS                        11.1   7.86  )-----------( 389      ( 11 May 2025 05:16 )             34.0     05-11    141  |  102  |  22  ----------------------------<  88  3.3[L]   |  26  |  0.66    Ca    9.1      11 May 2025 05:16          
  Infectious Diseases Follow Up:    Patient is a 64y old  Female who presents with a chief complaint of     Interval History/ROS:  : [id number]  No acute events noted, pt is doing well post OP     Allergies  No Known Allergies        ANTIMICROBIALS:  ceFAZolin   IVPB 2000 every 8 hours      Current Abx:     Previous Abx     OTHER MEDS:  MEDICATIONS  (STANDING):  acetaminophen     Tablet .. 975 every 8 hours  aspirin  chewable 81 daily  atorvastatin 10 at bedtime  losartan 12.5 daily  magnesium hydroxide Suspension 30 daily PRN  ondansetron Injectable 4 every 6 hours PRN  oxyCODONE    IR 5 every 4 hours PRN  oxyCODONE    IR 10 every 4 hours PRN  pantoprazole    Tablet 40 before breakfast  polyethylene glycol 3350 17 at bedtime  senna 2 at bedtime  traMADol 50 every 6 hours PRN      Vital Signs Last 24 Hrs  T(C): 37.2 (12 May 2025 09:12), Max: 37.2 (12 May 2025 09:12)  T(F): 98.9 (12 May 2025 09:12), Max: 98.9 (12 May 2025 09:12)  HR: 71 (12 May 2025 09:12) (71 - 86)  BP: 114/61 (12 May 2025 09:12) (101/51 - 120/64)  BP(mean): --  RR: 17 (12 May 2025 09:12) (16 - 17)  SpO2: 97% (12 May 2025 09:12) (94% - 99%)    Parameters below as of 12 May 2025 09:12  Patient On (Oxygen Delivery Method): room air        PHYSICAL EXAM:  GENERAL: NAD, well-developed  HEAD:  Atraumatic, Normocephalic  EYES: EOMI, conjunctiva and sclera clear  CHEST/LUNG: On RA, not in respiratory distress, clear to auscultation bilaterally;   HEART: Regular rate and rhythm;   ABDOMEN: Soft, Nontender, Nondistended;   EXTREMITIES:  LLE wrapped   PSYCH: AAOx3                            11.1   5.35  )-----------( 363      ( 12 May 2025 05:38 )             34.0       05-12    140  |  102  |  16  ----------------------------<  99  3.6   |  28  |  0.63    Ca    9.0      12 May 2025 05:38        Urinalysis Basic - ( 12 May 2025 05:38 )    Color: x / Appearance: x / SG: x / pH: x  Gluc: 99 mg/dL / Ketone: x  / Bili: x / Urobili: x   Blood: x / Protein: x / Nitrite: x   Leuk Esterase: x / RBC: x / WBC x   Sq Epi: x / Non Sq Epi: x / Bacteria: x        MICROBIOLOGY:  v  Tissue  05-09-25   No growth  --    Rare polymorphonuclear leukocytes per low power field  No organisms seen per oil power field      Tissue  05-09-25   No growth  --    Few polymorphonuclear leukocytes per low power field  Rare Gram Positive Cocci in Clusters per oil power field      Abscess  05-09-25   No growth  --    Few polymorphonuclear leukocytes per low power field  Few Gram Positive Cocci in Clusters per oil power field      Abscess  05-09-25   No growth  --    Few polymorphonuclear leukocytes per low power field  Rare Gram Positive Cocci in Clusters per oil power field                RADIOLOGY:    
[facility] Division of Hospital Medicine  Alejandro Rico MD  Contact via [employer] Teams (Mon-Fri 8AM-4PM)  Otherwise: contact Hospitalist in Charge at [phone]    Patient is a 64y old  Female who presents with a chief complaint of Elective admission for left distal tibial procedure (12 May 2025 10:39)    SUBJECTIVE / OVERNIGHT EVENTS:  Patient offers no new complaints. No F/C, N/V, CP, SOB, Cough, lightheadedness, dizziness, abdominal pain, diarrhea, dysuria.    MEDICATIONS  (STANDING):  acetaminophen     Tablet .. 975 milliGRAM(s) Oral every 8 hours  aspirin  chewable 81 milliGRAM(s) Oral daily  atorvastatin 10 milliGRAM(s) Oral at bedtime  ceFAZolin   IVPB 2000 milliGRAM(s) IV Intermittent every 8 hours  losartan 12.5 milliGRAM(s) Oral daily  pantoprazole    Tablet 40 milliGRAM(s) Oral before breakfast  polyethylene glycol 3350 17 Gram(s) Oral at bedtime  senna 2 Tablet(s) Oral at bedtime    MEDICATIONS  (PRN):  magnesium hydroxide Suspension 30 milliLiter(s) Oral daily PRN Constipation  ondansetron Injectable 4 milliGRAM(s) IV Push every 6 hours PRN Nausea and/or Vomiting  oxyCODONE    IR 5 milliGRAM(s) Oral every 4 hours PRN Moderate Pain (4 - 6)  oxyCODONE    IR 10 milliGRAM(s) Oral every 4 hours PRN Severe Pain (7 - 10)  traMADol 50 milliGRAM(s) Oral every 6 hours PRN Mild Pain (1 - 3)      Vital Signs Last 24 Hrs  T(C): 36.5 (14 May 2025 09:46), Max: 37.3 (14 May 2025 06:23)  T(F): 97.7 (14 May 2025 09:46), Max: 99.1 (14 May 2025 06:23)  HR: 76 (14 May 2025 09:46) (64 - 81)  BP: 108/59 (14 May 2025 09:46) (105/57 - 120/52)  BP(mean): --  RR: 18 (14 May 2025 09:46) (17 - 18)  SpO2: 97% (14 May 2025 09:46) (95% - 99%)    Parameters below as of 14 May 2025 09:46  Patient On (Oxygen Delivery Method): room air      CAPILLARY BLOOD GLUCOSE        I&O's Summary      PHYSICAL EXAM:  CONSTITUTIONAL: NAD, well-developed, well-groomed  EYES: PERRLA; conjunctiva and sclera clear  ENMT: Moist oral mucosa, no pharyngeal injection or exudates; normal dentition  NECK: Supple, no palpable masses; no thyromegaly  RESPIRATORY: Normal respiratory effort; lungs are clear to auscultation bilaterally  CARDIOVASCULAR: Regular rate and rhythm, normal S1 and S2, no murmur/rub/gallop; No lower extremity edema; Peripheral pulses are 2+ bilaterally  ABDOMEN: Nontender to palpation, normoactive bowel sounds, no rebound/guarding; No hepatosplenomegaly  MUSCULOSKELETAL:  Mild tenderness of surgical site left distal tibia wrapped in ACE wraps. Unable to assess gait; no clubbing or cyanosis of digits;  PSYCH: A+O to person, place, and time; affect appropriate  NEUROLOGY: CN 2-12 are intact and symmetric; no gross sensory deficits   SKIN: No rashes; no palpable lesions    LABS:                        11.1   5.78  )-----------( 344      ( 14 May 2025 05:30 )             34.0     05-14    140  |  104  |  14  ----------------------------<  88  3.9   |  27  |  0.59    Ca    9.1      14 May 2025 05:30            Urinalysis Basic - ( 14 May 2025 05:30 )    Color: x / Appearance: x / SG: x / pH: x  Gluc: 88 mg/dL / Ketone: x  / Bili: x / Urobili: x   Blood: x / Protein: x / Nitrite: x   Leuk Esterase: x / RBC: x / WBC x   Sq Epi: x / Non Sq Epi: x / Bacteria: x        RADIOLOGY & ADDITIONAL TESTS:    Imaging Personally Reviewed:    Care Discussed with Consultants/Other Providers:    Care Discussed with Orthopedic PA about:   
[facility] Division of Hospital Medicine  Alejandro Rico MD  Contact via Microsoft Teams (Mon-Fri 8AM-4PM)  Otherwise: contact Hospitalist in Charge at [phone]    Patient is a 64y old  Female who presents with a chief complaint of Elective admission for left distal tibial procedure (12 May 2025 10:39)    SUBJECTIVE / OVERNIGHT EVENTS:  Patient offers no new complaints. No F/C, N/V, CP, SOB, Cough, lightheadedness, dizziness, abdominal pain, diarrhea, dysuria.    MEDICATIONS  (STANDING):  acetaminophen     Tablet .. 975 milliGRAM(s) Oral every 8 hours  aspirin  chewable 81 milliGRAM(s) Oral daily  atorvastatin 10 milliGRAM(s) Oral at bedtime  ceFAZolin   IVPB 2000 milliGRAM(s) IV Intermittent every 8 hours  losartan 12.5 milliGRAM(s) Oral daily  pantoprazole    Tablet 40 milliGRAM(s) Oral before breakfast  polyethylene glycol 3350 17 Gram(s) Oral at bedtime  senna 2 Tablet(s) Oral at bedtime    MEDICATIONS  (PRN):  magnesium hydroxide Suspension 30 milliLiter(s) Oral daily PRN Constipation  ondansetron Injectable 4 milliGRAM(s) IV Push every 6 hours PRN Nausea and/or Vomiting  oxyCODONE    IR 5 milliGRAM(s) Oral every 4 hours PRN Moderate Pain (4 - 6)  oxyCODONE    IR 10 milliGRAM(s) Oral every 4 hours PRN Severe Pain (7 - 10)  traMADol 50 milliGRAM(s) Oral every 6 hours PRN Mild Pain (1 - 3)      Vital Signs Last 24 Hrs  T(C): 36.7 (13 May 2025 09:27), Max: 36.9 (12 May 2025 13:10)  T(F): 98.1 (13 May 2025 09:27), Max: 98.5 (12 May 2025 13:10)  HR: 74 (13 May 2025 09:27) (74 - 87)  BP: 112/58 (13 May 2025 09:27) (110/61 - 123/55)  BP(mean): --  RR: 18 (13 May 2025 09:27) (17 - 18)  SpO2: 95% (13 May 2025 09:27) (95% - 98%)    Parameters below as of 13 May 2025 09:27  Patient On (Oxygen Delivery Method): room air      CAPILLARY BLOOD GLUCOSE        I&O's Summary    12 May 2025 07:01  -  13 May 2025 07:00  --------------------------------------------------------  IN: 0 mL / OUT: 550 mL / NET: -550 mL        PHYSICAL EXAM:  CONSTITUTIONAL: NAD, well-developed, well-groomed  EYES: PERRLA; conjunctiva and sclera clear  ENMT: Moist oral mucosa, no pharyngeal injection or exudates; normal dentition  NECK: Supple, no palpable masses; no thyromegaly  RESPIRATORY: Normal respiratory effort; lungs are clear to auscultation bilaterally  CARDIOVASCULAR: Regular rate and rhythm, normal S1 and S2, no murmur/rub/gallop; No lower extremity edema; Peripheral pulses are 2+ bilaterally  ABDOMEN: Nontender to palpation, normoactive bowel sounds, no rebound/guarding; No hepatosplenomegaly  MUSCULOSKELETAL:  Mild tenderness of surgical site left distal tibia wrapped in ACE wraps. Unable to assess gait; no clubbing or cyanosis of digits;  PSYCH: A+O to person, place, and time; affect appropriate  NEUROLOGY: CN 2-12 are intact and symmetric; no gross sensory deficits   SKIN: No rashes; no palpable lesions      LABS:                        11.0   5.37  )-----------( 340      ( 13 May 2025 05:54 )             33.4     05-13    141  |  105  |  13  ----------------------------<  92  3.8   |  27  |  0.62    Ca    9.3      13 May 2025 05:54            Urinalysis Basic - ( 13 May 2025 05:54 )    Color: x / Appearance: x / SG: x / pH: x  Gluc: 92 mg/dL / Ketone: x  / Bili: x / Urobili: x   Blood: x / Protein: x / Nitrite: x   Leuk Esterase: x / RBC: x / WBC x   Sq Epi: x / Non Sq Epi: x / Bacteria: x        RADIOLOGY & ADDITIONAL TESTS:    Imaging Personally Reviewed:    Care Discussed with Consultants/Other Providers:    Care Discussed with Orthopedic PA about:

## 2025-05-15 NOTE — DISCHARGE NOTE PROVIDER - CARE PROVIDER_API CALL
Alie Jett  Musculoskeletal Oncology  [address] [place] [address]  Phone: [phone]  Fax: ()-  Follow Up Time:

## 2025-05-15 NOTE — DISCHARGE NOTE NURSING/CASE MANAGEMENT/SOCIAL WORK - NSDCPNINST_GEN_ALL_CORE
You have a post op appointment w/ dr Jett On 5/23/25 @10:30am. Maintain incision and dressing clean dry and intact. Call MD with any signs of infection ie fever, redness, drainage, or with signs of bleeding, unrelieved increased pain, or persistent nausea. Continue to drink plenty of fluids. Avoid strenuous activity and constipation which may be a side effect from taking certain medications and narcotics. Safety and fall prevention measures reinforced.

## 2025-05-15 NOTE — DISCHARGE NOTE NURSING/CASE MANAGEMENT/SOCIAL WORK - PATIENT PORTAL LINK FT
You can access the FollowMyHealth Patient Portal offered by [facility] by registering at the following website: [email or web address]. By joining [facility] FollowMyHealth portal, you will also be able to view your health information using other applications (apps) compatible with our system.

## 2025-05-15 NOTE — DISCHARGE NOTE NURSING/CASE MANAGEMENT/SOCIAL WORK - NSSCTYPOFSERV_GEN_ALL_CORE
The above infusion provider will contact you to arrange home delivery of the IV antibiotic and supplies. The infusion RN to see you for start of care this evening.

## 2025-05-15 NOTE — PROGRESS NOTE ADULT - PROVIDER SPECIALTY LIST ADULT
Infectious Disease
Infectious Disease
Orthopedics
Infectious Disease
Orthopedics
Orthopedics
Hospitalist
Hospitalist

## 2025-05-15 NOTE — DISCHARGE NOTE PROVIDER - HOSPITAL COURSE
This is a 63yo Female with PMH of HTN, HLD, and OM who presents to [facility] for orthopedic surgery. Patient s/p L distal tibia biopsy and sequestrectomy with Dr. Jett on 5/9. Patient tolerated the procedure well without any intraoperative complications. Patient tolerated physical therapy well, and the pain was controlled. Patient is weight bearing as tolerated with cane/walker as needed. Seen by medical attending for continuity of care and management and cleared for safe discharge. Keep dressing/incision clean, dry and intact. Any suture/staples to be removed on post-op day #14 at your office visit. Patient is on 81 mg of aspirin for DVT prophylaxis, please take for 6 weeks unless otherwise instructed by your surgeon. Please follow up with Dr. Jett in 2 weeks, call the office to make an appointment, [phone]. Please follow up with your PMD for continuity of care and management as medications may have changed.

## 2025-05-15 NOTE — PROGRESS NOTE ADULT - ASSESSMENT
64F HTN, Lt distal tibia Kali's abscess s/p I+D, Bx, sequestrectomy, stimulan bead placement on 5/9 c/b post-op leukocytosis, anemia.
64F s/p L distal tibia biopsy, sequestrectomy, I&D    WBAT LLE   FU OR cultures, gram stain GPCs  FU OR path  PT/OT  FU ID recs, on standing ancef  Dispo planning for home after final abx plan by ID
This is a 64 year old female with PMH of recurrent L distal tibia Klai's abscess ( s/p I&D on 2/9/2015 with cultures with MSSA and discharged on Bactrim to be completed through 3/23/2015 ) who presents for scheduled left distal tibia biopsy, I&D of abscess, sequestrectomy, and insertion of stimulan beads (performed on 5/9). OR cultures pending. Purulence noted in op note. Patient states she has been having left lower leg pain for the past 3 weeks with increasing difficulty walking so she followed up with ortho .Patient denies fevers and chills. Denies any recent trauma but reports she has had on and off pain to the leg after an injury at the age of 10; states she was running and her leg fell onto some rocks. Never sought medical attention for her leg at the time and denies ever having any surgeries to her leg until 2015.      Outpatient MRI from 4/25/25 showing: High STIR signal structure within the distal tibia demonstrating peripheral enhancement and diffuse surrounding bone marrow edema and postcontrast enhancement as described. This structure is of uncertain etiology. Given the clinical history, a primary consideration is a Kail abscess. Outpatient CRP from 4/30 noted to 11 but no leukocytosis.      Abx:   Cefazolin 2 g IVPB q8h (5/9-)     #Kali abscess s/p left distal tibia biopsy, I&D of abscess, sequestrectomy, and insertion of stimulan beads     Recommendations:   1. C/w with Cefazolin 2 g IVPB q8h for now. Will likely need 6 week course   2. f/u OR cultures, GPC in clusters on gram stain, Cx w/o growth  3. Monitor WBC and fever curve     Thank you for consulting us and involving us in the management of this patient's case. In addition to reviewing history, imaging, documents, labs, microbiology, and infection control strategies and potential issues.     ID will continue to follow    Dakotah Moses M.D.  Attending Physician  Division of Infectious Diseases  Department of Medicine    Please contact through MS Teams message.  Office: [phone] (after 5 PM or weekend)    
 64F s/p L distal tibia biopsy, sequestrectomy, I&D    WBAT LLE   FU OR cultures  FU OR path  PT/OT  FU ID recs      Kelly Colbert MD, PGY-2  Orthopaedic Surgery  [facility] [id number]  [facility]        [id number]  [facility]  [id number]/ [phone]
64F s/p L distal tibia biopsy, sequestrectomy, I&D    WBAT LLE   FU OR cultures, gram stain GPCs  FU OR path  PT/OT  FU ID recs, on standing ancef
64F s/p L distal tibia biopsy, sequestrectomy, I&D    WBAT LLE   FU OR cultures, gram stain GPCs  FU OR path  PT/OT  FU ID recs, on standing ancef  Dispo planning for home after final abx plan by ID and PICC
A/P: 64F s/p L distal tibia biopsy, sequestrectomy, I&D    WBAT LLE   FU OR cultures  FU OR path  PT/OT  FU ID recs    Thomas Staples MD  Orthopaedic Surgery Resident    For all questions, please reach out via the following numbers for the on-call resident; do not reach out via Teams.  [facility] [id number]  J        [id number]  [facility]  [id number]/ [phone]  
This is a 64 year old female with PMH of recurrent L distal tibia Kali's abscess ( s/p I&D on 2/9/2015 with cultures with MSSA and discharged on Bactrim to be completed through 3/23/2015 ) who presents for scheduled left distal tibia biopsy, I&D of abscess, sequestrectomy, and insertion of stimulan beads (performed on 5/9). OR cultures pending. Purulence noted in op note. Patient states she has been having left lower leg pain for the past 3 weeks with increasing difficulty walking so she followed up with ortho .Patient denies fevers and chills. Denies any recent trauma but reports she has had on and off pain to the leg after an injury at the age of 10; states she was running and her leg fell onto some rocks. Never sought medical attention for her leg at the time and denies ever having any surgeries to her leg until 2015.      Outpatient MRI from 4/25/25 showing: High STIR signal structure within the distal tibia demonstrating peripheral enhancement and diffuse surrounding bone marrow edema and postcontrast enhancement as described. This structure is of uncertain etiology. Given the clinical history, a primary consideration is a Kali abscess. Outpatient CRP from 4/30 noted to 11 but no leukocytosis.      Abx:   Cefazolin 2 g IVPB q8h (5/9-)     #Kali abscess s/p left distal tibia biopsy, I&D of abscess, sequestrectomy, and insertion of stimulan beads     Recommendations:   1. C/w with Cefazolin 2 g IVPB q8h for now. Will likely need 6 week course   2. f/u OR cultures, GPC in clusters on gram stain, Cx w/o growth  3. Monitor WBC and fever curve     Thank you for consulting us and involving us in the management of this patient's case. In addition to reviewing history, imaging, documents, labs, microbiology, and infection control strategies and potential issues.     ID will continue to follow    Dakotah Moses M.D.  Attending Physician  Division of Infectious Diseases  Department of Medicine    Please contact through MS Teams message.  Office: [phone] (after 5 PM or weekend)    
This is a 64 year old female with PMH of recurrent L distal tibia Kali's abscess ( s/p I&D on 2/9/2015 with cultures with MSSA and discharged on Bactrim to be completed through 3/23/2015 ) who presents for scheduled left distal tibia biopsy, I&D of abscess, sequestrectomy, and insertion of stimulan beads (performed on 5/9). OR cultures pending. Purulence noted in op note. Patient states she has been having left lower leg pain for the past 3 weeks with increasing difficulty walking so she followed up with ortho .Patient denies fevers and chills. Denies any recent trauma but reports she has had on and off pain to the leg after an injury at the age of 10; states she was running and her leg fell onto some rocks. Never sought medical attention for her leg at the time and denies ever having any surgeries to her leg until 2015.      Outpatient MRI from 4/25/25 showing: High STIR signal structure within the distal tibia demonstrating peripheral enhancement and diffuse surrounding bone marrow edema and postcontrast enhancement as described. This structure is of uncertain etiology. Given the clinical history, a primary consideration is a Kali abscess. Outpatient CRP from 4/30 noted to 11 but no leukocytosis.      Abx:   Cefazolin 2 g IVPB q8h (5/9-)     #Kali abscess s/p left distal tibia biopsy, I&D of abscess, sequestrectomy, and insertion of stimulan beads   #OR Cx GPC in clusters, no growth      Recommendations:   1. C/w with Cefazolin 2 g IVPB q8h for now. Will likely need 6 week course   2. f/u OR cultures, GPC in clusters on gram stain, Cx w/o growth  3. Would recommend PICC, plan on 6 weeks of IV Cefazolin 2 g q8 until 6/19/25.   Weekly CBC, CMP     Thank you for consulting us and involving us in the management of this patient's case. In addition to reviewing history, imaging, documents, labs, microbiology, and infection control strategies and potential issues.     ID will continue to follow    Dakotah Moses M.D.  Attending Physician  Division of Infectious Diseases  Department of Medicine    Please contact through MS Teams message.  Office: [phone] (after 5 PM or weekend)    
64F HTN, Lt distal tibia Kali's abscess s/p I+D, Bx, sequestrectomy, stimulan bead placement on 5/9 c/b post-op leukocytosis, anemia.

## 2025-05-15 NOTE — PROCEDURE NOTE - PROCEDURE FINDINGS AND DETAILS
Successful insertion of a 4Fr single lumen PICC via the left basilic vein.   Tip of PICC in SVC. PICC length: 45cm

## 2025-05-15 NOTE — DISCHARGE NOTE NURSING/CASE MANAGEMENT/SOCIAL WORK - FINANCIAL ASSISTANCE
[facility] provides services at a reduced cost to those who are determined to be eligible through [facility]’s financial assistance program. Information regarding [facility]’s financial assistance program can be found by going to [email or web address] or by calling [phone].

## 2025-05-16 LAB
CULTURE RESULTS: ABNORMAL
SPECIMEN SOURCE: SIGNIFICANT CHANGE UP

## 2025-05-17 LAB
CULTURE RESULTS: SIGNIFICANT CHANGE UP
SPECIMEN SOURCE: SIGNIFICANT CHANGE UP

## 2025-05-18 LAB
CULTURE RESULTS: SIGNIFICANT CHANGE UP
SPECIMEN SOURCE: SIGNIFICANT CHANGE UP

## 2025-05-19 ENCOUNTER — APPOINTMENT (OUTPATIENT)
Dept: ORTHOPEDIC SURGERY | Facility: CLINIC | Age: 65
End: 2025-05-19
Payer: MEDICARE

## 2025-05-19 PROBLEM — M25.572 PAIN IN LEFT ANKLE AND JOINTS OF LEFT FOOT: Chronic | Status: ACTIVE | Noted: 2025-05-02

## 2025-05-19 PROBLEM — M86.60 OTHER CHRONIC OSTEOMYELITIS, UNSPECIFIED SITE: Chronic | Status: ACTIVE | Noted: 2025-05-02

## 2025-05-19 PROBLEM — I10 ESSENTIAL (PRIMARY) HYPERTENSION: Chronic | Status: ACTIVE | Noted: 2025-05-02

## 2025-05-19 PROCEDURE — 99024 POSTOP FOLLOW-UP VISIT: CPT

## 2025-05-20 ENCOUNTER — NON-APPOINTMENT (OUTPATIENT)
Age: 65
End: 2025-05-20

## 2025-05-20 PROBLEM — E78.5 HYPERLIPIDEMIA, UNSPECIFIED: Chronic | Status: ACTIVE | Noted: 2025-05-02

## 2025-05-20 LAB — SURGICAL PATHOLOGY STUDY: SIGNIFICANT CHANGE UP

## 2025-05-22 ENCOUNTER — NON-APPOINTMENT (OUTPATIENT)
Age: 65
End: 2025-05-22

## 2025-05-28 ENCOUNTER — APPOINTMENT (OUTPATIENT)
Dept: ORTHOPEDIC SURGERY | Facility: CLINIC | Age: 65
End: 2025-05-28
Payer: MEDICARE

## 2025-05-28 DIAGNOSIS — M86.662 OTHER CHRONIC OSTEOMYELITIS, LEFT TIBIA AND FIBULA: ICD-10-CM

## 2025-05-28 PROCEDURE — 99024 POSTOP FOLLOW-UP VISIT: CPT

## 2025-06-03 ENCOUNTER — APPOINTMENT (OUTPATIENT)
Dept: INFECTIOUS DISEASE | Facility: CLINIC | Age: 65
End: 2025-06-03

## 2025-06-07 LAB
CULTURE RESULTS: SIGNIFICANT CHANGE UP
SPECIMEN SOURCE: SIGNIFICANT CHANGE UP

## 2025-06-11 ENCOUNTER — APPOINTMENT (OUTPATIENT)
Dept: ORTHOPEDIC SURGERY | Facility: CLINIC | Age: 65
End: 2025-06-11
Payer: MEDICARE

## 2025-06-11 PROCEDURE — 73610 X-RAY EXAM OF ANKLE: CPT | Mod: LT

## 2025-06-11 PROCEDURE — 99024 POSTOP FOLLOW-UP VISIT: CPT

## 2025-06-19 ENCOUNTER — NON-APPOINTMENT (OUTPATIENT)
Age: 65
End: 2025-06-19

## 2025-06-25 ENCOUNTER — APPOINTMENT (OUTPATIENT)
Dept: INFECTIOUS DISEASE | Facility: CLINIC | Age: 65
End: 2025-06-25
Payer: MEDICARE

## 2025-06-25 VITALS
DIASTOLIC BLOOD PRESSURE: 77 MMHG | HEIGHT: 56 IN | HEART RATE: 84 BPM | BODY MASS INDEX: 29.25 KG/M2 | OXYGEN SATURATION: 95 % | WEIGHT: 130 LBS | SYSTOLIC BLOOD PRESSURE: 121 MMHG | TEMPERATURE: 98.3 F

## 2025-06-25 PROBLEM — M86.8X6: Status: ACTIVE | Noted: 2025-06-23

## 2025-06-25 PROCEDURE — 99213 OFFICE O/P EST LOW 20 MIN: CPT

## (undated) DEVICE — ELCTR BOVIE PENCIL SMOKE EVACUATION

## (undated) DEVICE — PACKING GAUZE PLAIN 2"

## (undated) DEVICE — ELCTR BOVIE TIP BLADE INSULATED 2.8" EDGE WITH SAFETY

## (undated) DEVICE — DRSG COBAN 6"

## (undated) DEVICE — PACK LIJ BASIC ORTHO

## (undated) DEVICE — DRAPE STICKY U BLUE 60 X 84"

## (undated) DEVICE — TOURNIQUET ESMARK 6"

## (undated) DEVICE — DRAPE U (BLUE) 60 X 60"

## (undated) DEVICE — DRSG ACE BANDAGE 6"

## (undated) DEVICE — DRAPE SURGICAL #1010

## (undated) DEVICE — DRSG COBAN COMPRESSION SYSTEM 2 LAYER

## (undated) DEVICE — DRSG MEPILEX 10 X 20CM (4 X 8") WHITE

## (undated) DEVICE — BASIN SET SINGLE

## (undated) DEVICE — DRAPE LARGE SHEET 72X85"

## (undated) DEVICE — ELCTR GROUNDING PAD ADULT COVIDIEN

## (undated) DEVICE — DRAPE 3/4 SHEET 52X76"

## (undated) DEVICE — GLV 8 PROTEXIS (CREAM) MICRO

## (undated) DEVICE — SUCTION YANKAUER NO CONTROL VENT

## (undated) DEVICE — PREP CHLORAPREP HI-LITE ORANGE 26ML

## (undated) DEVICE — DRSG XEROFORM 5 X 9"

## (undated) DEVICE — POSITIONER STRAP ARMBOARD VELCRO TS-30

## (undated) DEVICE — CONNECTOR 5 IN1 SUCTION TUBING

## (undated) DEVICE — BLADE SURGICAL #15 CARBON

## (undated) DEVICE — DRSG STOCKINETTE IMPERVIOUS XL 12 X 48"

## (undated) DEVICE — SYR LUER LOK 10CC

## (undated) DEVICE — LIJ/LIA-ESU VALLEYLAB FORCE TRIAD T2D28932EX: Type: DURABLE MEDICAL EQUIPMENT

## (undated) DEVICE — WARMING BLANKET UPPER ADULT

## (undated) DEVICE — DRSG KLING 4"

## (undated) DEVICE — PREP SCRUB BRUSH W CHG 4%

## (undated) DEVICE — VENODYNE/SCD SLEEVE CALF MEDIUM

## (undated) DEVICE — TAPE SILK 3"

## (undated) DEVICE — DRSG TELFA 3 X 8

## (undated) DEVICE — TRAP SPECIMEN SPUTUM 40CC

## (undated) DEVICE — SUT MONOCRYL 3-0 27" PS-2 UNDYED

## (undated) DEVICE — PROTECTOR HEEL / ELBOW FLUFFY

## (undated) DEVICE — NDL HYPO REGULAR BEVEL 25G X 1.5" (BLUE)